# Patient Record
Sex: MALE | Race: WHITE | Employment: PART TIME | URBAN - METROPOLITAN AREA
[De-identification: names, ages, dates, MRNs, and addresses within clinical notes are randomized per-mention and may not be internally consistent; named-entity substitution may affect disease eponyms.]

---

## 2017-05-03 ENCOUNTER — ALLSCRIPTS OFFICE VISIT (OUTPATIENT)
Dept: OTHER | Facility: OTHER | Age: 67
End: 2017-05-03

## 2017-05-03 ENCOUNTER — GENERIC CONVERSION - ENCOUNTER (OUTPATIENT)
Dept: OTHER | Facility: OTHER | Age: 67
End: 2017-05-03

## 2017-05-03 DIAGNOSIS — Z12.11 ENCOUNTER FOR SCREENING FOR MALIGNANT NEOPLASM OF COLON: ICD-10-CM

## 2017-05-03 DIAGNOSIS — E78.5 HYPERLIPIDEMIA: ICD-10-CM

## 2017-05-03 DIAGNOSIS — Z13.1 ENCOUNTER FOR SCREENING FOR DIABETES MELLITUS: ICD-10-CM

## 2017-05-03 DIAGNOSIS — E55.9 VITAMIN D DEFICIENCY: ICD-10-CM

## 2017-10-24 ENCOUNTER — TRANSCRIBE ORDERS (OUTPATIENT)
Dept: LAB | Facility: OTHER | Age: 67
End: 2017-10-24

## 2017-10-24 ENCOUNTER — APPOINTMENT (OUTPATIENT)
Dept: LAB | Facility: OTHER | Age: 67
End: 2017-10-24
Payer: MEDICARE

## 2017-10-24 DIAGNOSIS — E78.5 HYPERLIPIDEMIA: ICD-10-CM

## 2017-10-24 DIAGNOSIS — E55.9 VITAMIN D DEFICIENCY: ICD-10-CM

## 2017-10-24 DIAGNOSIS — Z13.1 ENCOUNTER FOR SCREENING FOR DIABETES MELLITUS: ICD-10-CM

## 2017-10-24 DIAGNOSIS — R97.20 ELEVATED PROSTATE SPECIFIC ANTIGEN (PSA): Primary | ICD-10-CM

## 2017-10-24 LAB
25(OH)D3 SERPL-MCNC: 22.7 NG/ML (ref 30–100)
ALBUMIN SERPL BCP-MCNC: 3.7 G/DL (ref 3.5–5)
ALP SERPL-CCNC: 39 U/L (ref 46–116)
ALT SERPL W P-5'-P-CCNC: 38 U/L (ref 12–78)
ANION GAP SERPL CALCULATED.3IONS-SCNC: 5 MMOL/L (ref 4–13)
AST SERPL W P-5'-P-CCNC: 34 U/L (ref 5–45)
BILIRUB SERPL-MCNC: 0.82 MG/DL (ref 0.2–1)
BUN SERPL-MCNC: 19 MG/DL (ref 5–25)
CALCIUM SERPL-MCNC: 9.1 MG/DL (ref 8.3–10.1)
CHLORIDE SERPL-SCNC: 104 MMOL/L (ref 100–108)
CHOLEST SERPL-MCNC: 160 MG/DL (ref 50–200)
CO2 SERPL-SCNC: 26 MMOL/L (ref 21–32)
CREAT SERPL-MCNC: 1.39 MG/DL (ref 0.6–1.3)
GFR SERPL CREATININE-BSD FRML MDRD: 52 ML/MIN/1.73SQ M
GLUCOSE P FAST SERPL-MCNC: 93 MG/DL (ref 65–99)
HDLC SERPL-MCNC: 36 MG/DL (ref 40–60)
LDLC SERPL CALC-MCNC: 83 MG/DL (ref 0–100)
POTASSIUM SERPL-SCNC: 4.7 MMOL/L (ref 3.5–5.3)
PROT SERPL-MCNC: 7.4 G/DL (ref 6.4–8.2)
PSA SERPL-MCNC: 4.3 NG/ML (ref 0–4)
SODIUM SERPL-SCNC: 135 MMOL/L (ref 136–145)
TRIGL SERPL-MCNC: 203 MG/DL

## 2017-10-24 PROCEDURE — 82306 VITAMIN D 25 HYDROXY: CPT

## 2017-10-24 PROCEDURE — 80053 COMPREHEN METABOLIC PANEL: CPT

## 2017-10-24 PROCEDURE — 36415 COLL VENOUS BLD VENIPUNCTURE: CPT

## 2017-10-24 PROCEDURE — G0103 PSA SCREENING: HCPCS

## 2017-10-24 PROCEDURE — 80061 LIPID PANEL: CPT

## 2017-10-30 ENCOUNTER — ALLSCRIPTS OFFICE VISIT (OUTPATIENT)
Dept: OTHER | Facility: OTHER | Age: 67
End: 2017-10-30

## 2017-10-30 DIAGNOSIS — R79.89 OTHER SPECIFIED ABNORMAL FINDINGS OF BLOOD CHEMISTRY: ICD-10-CM

## 2017-10-31 NOTE — PROGRESS NOTES
Assessment  1  Elevated serum creatinine (790 99) (R79 89)   2  Vitamin D deficiency (268 9) (E55 9)   3  Enlarged prostate (600 00) (N40 0)   4  Hypertriglyceridemia (272 1) (E78 1)    Plan  Elevated serum creatinine    · (1) COMPREHENSIVE METABOLIC PANEL; Status:Active; Requested FMK:[id number]; Health Maintenance    · *VB - Fall Risk Assessment  (Dx Z13 89 Screen for Neurologic Disorder);  Status:Complete - Retrospective Authorization;   Done: 66YAE9627 09:05AM   · *VB - Urinary Incontinence Screen (Dx Z13 89 Screen for UI); Status:Complete -  Retrospective Authorization;   Done: 14YCJ9792 09:05AM    Discussion/Summary    Elevated creatinine- he was counseled in regards to avoid NSAIDs however he states that he needs it for his back advised to keep for a bare minimum and advised not to take and consider taking Tylenol instead  D deficiency- he states that he will take OTC Vit D supplementation  Prostate- he was advised to follow up with Urologist, Dr Matt Kam  he was advised to continue a low car diet  Also advised to take a daily fish oil  up in 3 months  Possible side effects of new medications were reviewed with the patient/guardian today  The treatment plan was reviewed with the patient/guardian  The patient/guardian understands and agrees with the treatment plan      Chief Complaint  Patient is here today for follow up on medical issues and review test results      History of Present Illness  Patient is here to follow up for elevated creatinine, 1 39, he does have a history of enlarged prostate and sees Dr Matt Kam  He denies any problems with hi kidneys in the past triglycerides at this time over 200 mg/dL he does take fenofibrate daily  He says that he does not follow a regular low Carb diet however  Vit D h used to be on supplements however he stopped taking them months ago  was found to have elevated PSA 4 3   He dies have a history of enlarged prostate and sees Dr Matt Kam for urologist       Review of Systems    Constitutional: No fever or chills, feels well, no tiredness, no recent weight gain or weight loss  Eyes: No complaints of eye pain, no red eyes, no discharge from eyes, no itchy eyes  Cardiovascular: No complaints of slow heart rate, no fast heart rate, no chest pain, no palpitations, no leg claudication, no lower extremity  Respiratory: No complaints of shortness of breath, no wheezing, no cough, no SOB on exertion, no orthopnea or PND  Gastrointestinal: No complaints of abdominal pain, no constipation, no nausea or vomiting, no diarrhea or bloody stools  Genitourinary: as noted in HPI  Musculoskeletal: No complaints of arthralgia, no myalgias, no joint swelling or stiffness, no limb pain or swelling  Integumentary: No complaints of skin rash or skin lesions, no itching, no skin wound, no dry skin  Neurological: No compliants of headache, no confusion, no convulsions, no numbness or tingling, no dizziness or fainting, no limb weakness, no difficulty walking  Endocrine: No complaints of proptosis, no hot flashes, no muscle weakness, no erectile dysfunction, no deepening of the voice, no feelings of weakness  Preventive Quality 65 and Older: Falls Risk: The patient fell 0 times in the past 12 months  The patient is currently experiencing urinary symptoms  Urinary Incontinence Symptoms includes: nocturia    sees urologist      ROS reviewed  Active Problems  1  Anxiety (300 00) (F41 9)   2  Cervical radiculopathy (723 4) (M54 12)   3  Colon cancer screening (V76 51) (Z12 11)   4  Encounter for screening for cardiovascular disorders (V81 2) (Z13 6)   5  Hyperglycemia (790 29) (R73 9)   6  Hyperlipidemia (272 4) (E78 5)   7  Hypertension (401 9) (I10)   8  Joint pain (719 40) (M25 50)   9  Medicare annual wellness visit, initial (V70 0) (Z00 00)   10  Peripheral neuropathy (356 9) (G62 9)   11  RLS (restless legs syndrome) (333 94) (G25 81)   12   Screening for colon cancer (V76 51) (Z12 11)   13  Screening for diabetes mellitus (V77 1) (Z13 1)   14  Screening for lipid disorders (V77 91) (Z13 220)   15  Testicle pain (608 9) (N50 819)   16  Vitamin D deficiency (268 9) (E55 9)    Past Medical History  1  History of Displacement of lumbar intervertebral disc without myelopathy (722 10)   (M51 26)   2  History of Screening for colon cancer (V76 51) (Z12 11)   3  History of Stenosis, spinal, lumbar (724 02) (M48 061)    The active problems and past medical history were reviewed and updated today  Surgical History  1  History of Cholecystectomy    Family History  Mother    1  Family history of    2  Family history of Dialysis patient   3  Family history of kidney disease (V18 69) (Z84 1)  Father    4  Family history of    5  Family history of cardiac disorder (V17 49) (Z82 49)    Social History   · Alcohol Use (History)   · Caffeine Use   · Former smoker (W25 73) (U14 504)   ·    · Retired    Current Meds   1  Aspirin Low Dose 81 MG Oral Tablet Chewable; CHEW 3 TABLET Daily PRN; Therapy: (Recorded:46Mvr9336) to Recorded   2  Fenofibrate 160 MG Oral Tablet; take 1 tablet every day; Therapy: 26GTC2964 to (Evaluate:2018)  Requested for: 73Frc2921; Last   Rx:94Ood8800 Ordered   3  Flomax 0 4 MG Oral Capsule; TAKE CAPSULE Daily; Therapy: (Recorded:2016) to Recorded   4  Gabapentin 600 MG Oral Tablet; take 1/2 tablet twice daily; Therapy: 29ONZ1006 to (Evaluate:2018)  Requested for: 18Fkt9837; Last   Rx:89Juj6479 Ordered   5  Glucosamine-Chondroitin Oral Tablet; take 2 tablet daily; Therapy: (Kristin Vela) to Recorded   6  Lisinopril-Hydrochlorothiazide 20-12 5 MG Oral Tablet; take 1 tablet every day; Therapy: 22BKJ1488 to (Evaluate:23Pib6198)  Requested for: 74Zbn9102; Last   Rx:88Nas4295 Ordered   7  LORazepam 1 MG Oral Tablet; take 1 tablet daily prn; Therapy: 51CKG2466 to (Evaluate:65Tqe5822); Last VF:22MSX3965 Ordered   8   Mobic 15 MG Oral Tablet; TAKE TABLET Daily PRN; Therapy: (Recorded:02Mar2016) to Recorded   9  Multi For Him 50+ TABS; TAKE 1 TABLET DAILY; Therapy: (Recorded:78Tgt5296) to Recorded   10  Saw [place] TABS Recorded   11  Tylenol 325 MG Oral Tablet; TAKE 2 TABLET Daily PRN; Therapy: (Recorded:50Vzn9147) to Recorded   12  Vitamin D3 1000 UNIT Oral Tablet; TAKE 1 TABLET DAILY; Therapy: (Recorded:02Mar2016) to Recorded    Allergies  1  Statins  2  No Known Environmental Allergies   3  No Known Food Allergies    Vitals  Vital Signs    Recorded: 69PQG8852 09:02AM   Temperature 97 6 F   Heart Rate 74   Systolic 647   Diastolic 72   Height 5 ft 10 in   Weight 237 lb 8 0 oz   BMI Calculated 34 08   BSA Calculated 2 25   O2 Saturation 98     Physical Exam    Constitutional   General appearance: No acute distress, well appearing and well nourished  Eyes   Conjunctiva and lids: No swelling, erythema, or discharge  Pulmonary   Respiratory effort: No increased work of breathing or signs of respiratory distress  Auscultation of lungs: Clear to auscultation, equal breath sounds bilaterally, no wheezes, no rales, no rhonci  Cardiovascular   Auscultation of heart: Normal rate and rhythm, normal S1 and S2, without murmurs  Musculoskeletal   Gait and station: Normal     Skin   Skin and subcutaneous tissue: Normal without rashes or lesions      Psychiatric   Orientation to person, place and time: Normal          Results/Data  *VB - Fall Risk Assessment  (Dx Z13 89 Screen for Neurologic Disorder) 03GBH7144 09:05AM [facility] Pharm[facility] Locker     Test Name Result Flag Reference   Falls Risk      No falls in the past year     *VB - Urinary Incontinence Screen (Dx Z13 89 Screen for UI) 34MTT6955 09:05AM Flonnie Locker   sees urology     Test Name Result Flag Reference   Urinary Incontinence Assessment 93ECW0042         Signatures   Electronically signed by : Zaina Arellano MD; Oct 30 2017  9:35AM EST                       (Author)

## 2017-11-22 ENCOUNTER — ALLSCRIPTS OFFICE VISIT (OUTPATIENT)
Dept: OTHER | Facility: OTHER | Age: 67
End: 2017-11-22

## 2017-11-23 NOTE — PROGRESS NOTES
Assessment  1  Peripheral neuropathy (356 9) (G62 9)   2  Strain of thoracic spine (847 1) (S29 019A)    Plan  Peripheral neuropathy    · Gabapentin 100 MG Oral Capsule; take 1 capsule 3 times a day   Rx By: Malcolm Newby; Dispense: 30 Days ; #:90 Capsule; Refill: 2;For: Peripheral neuropathy; BALWINDER = N; Verified Transmission to [facility]; Last Updated By: [employer]; 11/22/2017 1:06:06 PM  Peripheral neuropathy, RLS (restless legs syndrome)    · Gabapentin 600 MG Oral Tablet   Rx By: Malcolm Newby; Dispense: 90 Days ; #:90 TAB; Refill: 2;Peripheral neuropathy, RLS (restless legs syndrome); BALWINDER = N; Sent To: [facility]  Strain of thoracic spine    · Follow-up visit in 2 months Evaluation and Treatment  Follow-up  Status: Hold For -Scheduling  Requested for: 22Nov2017   Ordered;Strain of thoracic spine; Ordered By: Malcolm Newby Performed:  Due: 28ULD8264    Discussion/Summary  Discussion Summary:   Patient with a history of recent mid back pain, history polyneuropathy, restless leg syndrome is now advised to lower the dose of gabapentin 200 mg 3 times a day due to his renal function  Patient is advised to repeat BUN creatinine in 2-3 weeks and if he continues to have pain in the thoracic region may benefit from a CT scan of the abdomen to rule out the possibility of a renal calculus  Patient will also discuss the same with his urologist  Patient will return back to see me in 2-3 months  Chief Complaint  Chief Complaint Free Text Note Form: Patient present for follow up appointment for his peripheral neuropathy and RLS  History of Present Illness  HPI: Patient is here for a follow-up visit with a history of polyneuropathy, and has been maintained on gabapentin 300 mg twice a day  He also had a history of low back pain for which he used to use meloxicam on a p r n  basis   In the recent past he describes pain in the thoracic region ever since he bent down to perform and task at home and since then has been experiencing intermittent left flank pain for the last 1 month which generally relieved with the help of meloxicam  In the meanwhile he went to his primary physician and also had blood work done and his creatinine level was noted to be elevated  Since then the patient has not been using meloxicam  Patient also is followed up with Urology for prostatic enlargement and denies any new neurological symptoms  Review of Systems  Neurological ROS:  Constitutional: no fever, no chills, no recent weight gain, no recent weight loss, no complaints of feeling tired, no changes in appetite  HEENT:  no sinus problems, not feeling congested, no blurred vision, no dryness of the eyes, no eye pain, no hearing loss, no tinnitus, no mouth sores, no sore throat, no hoarseness, no dysphagia, no masses, no bleeding  Cardiovascular:  no chest pain or pressure, no palpitations present, the heart rate was not rapid or irregular, no swelling in the arms or legs, no poor circulation  Respiratory:  no unusual or persistant cough, no shortness of breath with or without exertion  Gastrointestinal:  no nausea, no vomiting, no diarrhea, no abdominal pain, no changes in bowel habits, no melena, no loss of bowel control  Genitourinary:  no incontinence, no feelings of urinary urgency, no increase in frequency, no urinary hesitancy, no dysuria, no hematuria  Musculoskeletal: immobility or loss of function,-- head/neck/back pain-- and-- pain while walking  Integumentary  no masses, no rash, no skin lesions, no livedo reticularis  Psychiatric:  no anxiety, no depression, no mood swings, no psychiatric hospitalizations, no sleep problems  Endocrine hair loss or gain-- and-- loss of sexual ability or drive   Hematologic/Lymphatic:  no unusual bleeding, no tendency for easy bruising, no clotting skin or lumps  Neurological General: night thrashing    Neurological Mental Status:  no confusion, no mood swings, no alteration or loss of consciousness, no difficulty expressing/understanding speech, no memory problems  Neurological Cranial Nerves: taste or smell loss/changes-- and-- slurred speech  Neurological Motor findings include:  no tremor, no twitching, no cramping(pre/post exercise), no atrophy  Neurological Coordination: balance difficulties  Neurological Sensory:  no numbness, no pain, no tingling, does not fall when eyes closed or taking a shower  Neurological Gait: difficulty walking  ROS Reviewed:   ROS reviewed  Active Problems  1  Anxiety (300 00) (F41 9)   2  Cervical radiculopathy (723 4) (M54 12)   3  Colon cancer screening (V76 51) (Z12 11)   4  Elevated serum creatinine (790 99) (R79 89)   5  Encounter for screening for cardiovascular disorders (V81 2) (Z13 6)   6  Enlarged prostate (600 00) (N40 0)   7  Hyperglycemia (790 29) (R73 9)   8  Hyperlipidemia (272 4) (E78 5)   9  Hypertension (401 9) (I10)   10  Hypertriglyceridemia (272 1) (E78 1)   11  Joint pain (719 40) (M25 50)   12  Medicare annual wellness visit, initial (V70 0) (Z00 00)   13  Peripheral neuropathy (356 9) (G62 9)   14  RLS (restless legs syndrome) (333 94) (G25 81)   15  Screening for colon cancer (V76 51) (Z12 11)   16  Screening for diabetes mellitus (V77 1) (Z13 1)   17  Screening for lipid disorders (V77 91) (Z13 220)   18  Testicle pain (608 9) (N50 819)   19  Vitamin D deficiency (268 9) (E55 9)    Past Medical History    1  History of Displacement of lumbar intervertebral disc without myelopathy (722 10) (M51 26)   2  History of Screening for colon cancer (V76 51) (Z12 11)   3  History of Stenosis, spinal, lumbar (724 02) (M48 061)    Surgical History  1  History of Cholecystectomy    Family History  Mother    1  Family history of    2  Family history of Dialysis patient   3  Family history of kidney disease (V18 69) (Z84 1)  Father    4  Family history of    5   Family history of cardiac disorder (V17 49) (Z82 49)    Social History     · Alcohol Use (History)   · Caffeine Use   · Former smoker (E37 83) (O34 774)   ·    · Retired  Social History Reviewed: The social history was reviewed and updated today  Current Meds   1  Aspirin Low Dose 81 MG Oral Tablet Chewable; CHEW 3 TABLET Daily PRN; Therapy: (Recorded:85Jjt4492) to Recorded   2  Fenofibrate 160 MG Oral Tablet; take 1 tablet every day; Therapy: 72LUV7037 to (Evaluate:12Mar2018)  Requested for: 26Cpc9961; Last Rx:23Txb5632 Ordered   3  Flomax 0 4 MG Oral Capsule; TAKE CAPSULE Daily; Therapy: (Recorded:02Mar2016) to Recorded   4  Gabapentin 600 MG Oral Tablet; take 1/2 tablet twice daily; Therapy: 19PPW4476 to (Evaluate:09Jun2018)  Requested for: 18Zle3791; Last Rx:96Fem5838 Ordered   5  Glucosamine-Chondroitin Oral Tablet; take 2 tablet daily; Therapy: (Agus Vasquez) to Recorded   6  Lisinopril-Hydrochlorothiazide 20-12 5 MG Oral Tablet; take 1 tablet every day; Therapy: 97BYK1820 to (Evaluate:34Vft6529)  Requested for: 42DFR9281; Last Rx:17Cff3629 Ordered   7  Mobic 15 MG Oral Tablet; TAKE TABLET Daily PRN; Therapy: (Recorded:02Mar2016) to Recorded   8  Multi For Him 50+ TABS; TAKE 1 TABLET DAILY; Therapy: (Agus Vasquez) to Recorded   9  Saw [place] TABS Recorded   10  Tylenol 325 MG Oral Tablet; TAKE 2 TABLET Daily PRN; Therapy: (Recorded:49Lxv0336) to Recorded   11  Vitamin D3 1000 UNIT Oral Tablet; TAKE 1 TABLET DAILY; Therapy: (Recorded:02Mar2016) to Recorded  Medication List Reviewed: The medication list was reviewed and updated today  Allergies  1  Statins    2  No Known Environmental Allergies   3   No Known Food Allergies    Vitals  Signs   Recorded: 22Nov2017 12:37PM   Heart Rate: 60  Systolic: 765  Diastolic: 66  Height: 5 ft 9 5 in  Weight: 230 lb   BMI Calculated: 33 48  BSA Calculated: 2 2    Physical Exam   Constitutional  General appearance: No acute distress, well appearing and well nourished  Musculoskeletal  Gait and station: Abnormal  -- Patient has a mild sway on Romberg testing  Muscle strength: Normal strength throughout  Muscle tone: No atrophy, abnormal movements, flaccidity, cogwheeling or spasticity  Neurologic  Orientation to person, place, and time: Normal    Language: Names objects, able to repeat phrases and speaks spontaneously  3rd, 4th, and 6th cranial nerves: Normal    7th cranial nerve: Normal    Sensation: Normal    Reflexes: Normal    Coordination: Normal   Patient has evidence of left flank tenderness  Future Appointments    Date/Time Provider Specialty Site   01/22/2018 02:20 PM Sumanth Champion MD Neurology [facility] [address] [place] [address]   Electronically signed by :  Belinda Montiel MD; Nov 22 2017  1:18PM EST                       (Author)

## 2018-01-14 VITALS
OXYGEN SATURATION: 98 % | SYSTOLIC BLOOD PRESSURE: 122 MMHG | DIASTOLIC BLOOD PRESSURE: 66 MMHG | TEMPERATURE: 97.9 F | HEART RATE: 76 BPM | BODY MASS INDEX: 34.23 KG/M2 | WEIGHT: 239.13 LBS | HEIGHT: 70 IN

## 2018-01-14 VITALS
DIASTOLIC BLOOD PRESSURE: 66 MMHG | WEIGHT: 230 LBS | BODY MASS INDEX: 32.93 KG/M2 | HEIGHT: 70 IN | SYSTOLIC BLOOD PRESSURE: 108 MMHG | HEART RATE: 60 BPM

## 2018-01-14 VITALS
TEMPERATURE: 97.6 F | SYSTOLIC BLOOD PRESSURE: 118 MMHG | OXYGEN SATURATION: 98 % | HEART RATE: 74 BPM | BODY MASS INDEX: 34 KG/M2 | WEIGHT: 237.5 LBS | DIASTOLIC BLOOD PRESSURE: 72 MMHG | HEIGHT: 70 IN

## 2018-01-16 NOTE — PROGRESS NOTES
Assessment    1  Medicare annual wellness visit, initial (V70 0) (Z00 00)   2  Encounter for screening for cardiovascular disorders (V81 2) (Z13 6)   3  History of Screening for colon cancer (V76 51) (Z12 11)   4  Screening for colon cancer (V76 51) (Z12 11)    Plan  Encounter for screening for cardiovascular disorders    · COLONOSCOPY; Status:Active; Requested BECKY:[id number]; Health Maintenance    · *VB - Fall Risk Assessment  (Dx V80 09 Screen for Neurologic Disorder);  Status:Complete;   Done: 06VQO8137 08:26AM   · *VB-Depression Screening; Status:Complete;   Done: 94IGL0337 08:27AM  Medicare annual wellness visit, initial    · [address]; Status:Hold For - Scheduling; Requested MYX:01JLE5139;     Discussion/Summary    Medicare wellness initial- advance directives paperwork given, U/S for AAA given  Patient is not interested on Immunizations at this time  Colonoscopy order given today  Impression: Initial Annual Wellness Visit  Cardiovascular screening and counseling: due for a lipid panel and Dx - V81 2 Screen for CV Disorder  Diabetes screening and counseling: due for blood glucose and Dx - V77 1 Screen for DM  Colorectal cancer screening and counseling: the risks and benefits of screening were discussed and Dx - V76 51 Screen for CRC  Prostate cancer screening and counseling: screening is current  Osteoporosis screening and counseling: screening not indicated  Abdominal aortic aneurysm screening and counseling: screening US recommended and Dx - V81 2 Screen for CV Disorder  Glaucoma screening and counseling: the risks and benefits of screening were discussed  Immunizations: the risks and benefits of influenza vaccination were discussed with the patient, the risks and benefits of pneumococcal vaccination were discussed with the patient, the risks and benefits of the Zostavax vaccine were discussed with the patient and Tdap vaccination up to date     Advance Directive Planning: complete and up to date  Chief Complaint  Patient seen in office today for a Medicare wellness exam       History of Present Illness  Medicare initial wellness visit   The patient is being seen for the initial annual wellness visit  Medicare Screening and Risk Factors   Hospitalizations: no previous hospitalizations  Medicare Screening Tests Risk Questions   Drug and Alcohol Use: The patient is a former cigarette smoker and quit smoking >25 yrs  The patient reports occasional alcohol use  He has never used illicit drugs  Diet and Physical Activity: Current diet includes well balanced meals, frequent junk food, 2 servings of fruit per day, 1 servings of meat per day, 2 servings of whole grains per day, 2 servings of dairy products per day and 3 cups of coffee per day  He is sedentary  Exercise: stretching  Functional Ability/Level of Safety: The patient is currently able to do activities of daily living without limitations, able to participate in social activities without limitations and able to drive without limitations  Advance Directives: Advance directives: no living will  Co-Managers and Medical Equipment/Suppliers: See Patient Care Team      Patient Care Team    Care Team Member Role Specialty Office Number   Tere Huerta MD Specialist Neurology [phone]   Justa Robledo MD Specialist Urology [phone]     Review of Systems    Constitutional: negative  Cardiovascular: negative  Respiratory: negative  Gastrointestinal: negative  Integumentary and Breasts: negative  Over the past 2 weeks, how often have you been bothered by the following problems? 1 ) Little interest or pleasure in doing things? Not at all    2 ) Feeling down, depressed or hopeless? Not at all    3 ) Trouble falling asleep or sleeping too much? Not at all    4 ) Feeling tired or having little energy? Not at all    5 ) Poor appetite or overeating?  Not at all    6 ) Feeling bad about yourself, or that you are a failure, or have let yourself or your family down? Not at all    7 ) Trouble concentrating on things, such as reading a newspaper or watching television? Not at all    8 ) Moving or speaking so slowly that other people could have noticed, or the opposite, moving or speaking faster than usual? Not at all  How difficult have these problems made it for you to do your work, take care of things at home, or get along with people? Not at all  Score 0      Active Problems    1  Cervical radiculopathy (723 4) (M54 12)   2  Hyperglycemia (790 29) (R73 9)   3  Hyperlipidemia (272 4) (E78 5)   4  Hypertension (401 9) (I10)   5  Joint pain (719 40) (M25 50)   6  Peripheral neuropathy (356 9) (G62 9)   7  Testicle pain (608 9) (N50 8)   8  Vitamin D deficiency (268 9) (E55 9)    Past Medical History    1  History of Displacement of lumbar intervertebral disc without myelopathy (722 10)   (M51 26)   2  History of Screening for colon cancer (V76 51) (Z12 11)   3  History of Stenosis, spinal, lumbar (724 02) (M48 06)    The active problems and past medical history were reviewed and updated today  Surgical History    1  History of Cholecystectomy    Family History  Father    1  No pertinent family history    Social History    · Denied: Alcohol Use (History)   · Caffeine Use    Current Meds   1  Fenofibrate 160 MG Oral Tablet; Take 1 tablet daily; Therapy: 93CQR4092 to (Evaluate:38Eps4626)  Requested for: 26XVU1794; Last   Rx:02Hxw5996 Ordered   2  Flomax 0 4 MG Oral Capsule; TAKE CAPSULE Daily; Therapy: (Recorded:02Mar2016) to Recorded   3  Gabapentin 300 MG TABS; Take 1 tablet twice daily; Therapy: (Recorded:02Mar2016) to Recorded   4  Glucosamine-Chondroitin Oral Tablet; TAKE TABLET Daily; Therapy: (Recorded:02Mar2016) to Recorded   5  Lisinopril-Hydrochlorothiazide 20-12 5 MG Oral Tablet; TAKE 1 TABLET DAILY;    Therapy: 89WJK3783 to (Evaluate:62Gmk4500)  Requested for: 24QJK4301; Last   EK:88AFX0062 Ordered   6  Mobic 15 MG Oral Tablet; TAKE TABLET Daily PRN; Therapy: (Recorded:02Mar2016) to Recorded   7  Saw [place] TABS Recorded   8  Vitamin D3 1000 UNIT Oral Tablet; TAKE 1 TABLET DAILY; Therapy: (Recorded:02Mar2016) to Recorded    Allergies    1   Statins    Immunizations  Influenza --- [facility]: Temporarily Deferred: Pt requests deferral   Pneumococcal --- [facility]: Temporarily Deferred: Pt requests deferral   Tdap --- [facility]: [id number]   Zoster --- [facility]: Temporarily Deferred: Pt requests deferral     Vitals  Signs [Data Includes: Current Encounter]   Recorded: 04ANQ6622 08:03AM   Temperature: 96 1 F  Heart Rate: 59  Systolic: 188  Diastolic: 66  Height: 5 ft 11 in  Weight: 237 lb 6 08 oz  BMI Calculated: 33 11  BSA Calculated: 2 26  O2 Saturation: 98    Results/Data  Encounter Results   *VB-Depression Screening 08LDQ2672 08:27AM Nena Nageotte     Test Name Result Flag Reference   Depression Scale Result      Depression Screen - Negative For Symptoms     *VB - Fall Risk Assessment  (Dx V80 09 Screen for Neurologic Disorder) 11HPC5019 08:26AM Nena Nageotte     Test Name Result Flag Reference   Fall Risk Assessment 12FGY6479       *VB-Urinary Incontinence Screen (Dx V81 6 Screen for UI) 24EWI0363 08:25AM Nena Nageotte     Test Name Result Flag Reference   Urinary Incontinence Assessment 06ZNZ7789         Future Appointments    Date/Time Provider Specialty Site   08/26/2016 11:00 AM Whitney West MD Neurology [facility] [address]   Electronically signed by : Svitlana Allen MD; Jun 7 2016  8:51AM EST                       (Author)

## 2018-01-17 NOTE — PROGRESS NOTES
Dear Tawanna Angel : We missed you for your originally scheduled neurological followup appointment with Dr Campbell Player  Please call at your earliest convenience to reschedule this appointment  Sincerely,     Delfina Tobias 89           Electronically signed by: Geovanny Mcguire   May  3 2017  9:36AM EST Co-author

## 2018-01-19 ENCOUNTER — APPOINTMENT (OUTPATIENT)
Dept: LAB | Facility: OTHER | Age: 68
End: 2018-01-19
Payer: MEDICARE

## 2018-01-19 ENCOUNTER — TRANSCRIBE ORDERS (OUTPATIENT)
Dept: LAB | Facility: OTHER | Age: 68
End: 2018-01-19

## 2018-01-19 DIAGNOSIS — R79.89 OTHER SPECIFIED ABNORMAL FINDINGS OF BLOOD CHEMISTRY: ICD-10-CM

## 2018-01-19 LAB
ALBUMIN SERPL BCP-MCNC: 4.1 G/DL (ref 3.5–5)
ALP SERPL-CCNC: 41 U/L (ref 46–116)
ALT SERPL W P-5'-P-CCNC: 35 U/L (ref 12–78)
ANION GAP SERPL CALCULATED.3IONS-SCNC: 6 MMOL/L (ref 4–13)
AST SERPL W P-5'-P-CCNC: 23 U/L (ref 5–45)
BILIRUB SERPL-MCNC: 0.63 MG/DL (ref 0.2–1)
BUN SERPL-MCNC: 21 MG/DL (ref 5–25)
CALCIUM SERPL-MCNC: 9.3 MG/DL (ref 8.3–10.1)
CHLORIDE SERPL-SCNC: 105 MMOL/L (ref 100–108)
CO2 SERPL-SCNC: 28 MMOL/L (ref 21–32)
CREAT SERPL-MCNC: 1.44 MG/DL (ref 0.6–1.3)
GFR SERPL CREATININE-BSD FRML MDRD: 49 ML/MIN/1.73SQ M
GLUCOSE P FAST SERPL-MCNC: 108 MG/DL (ref 65–99)
POTASSIUM SERPL-SCNC: 4.5 MMOL/L (ref 3.5–5.3)
PROT SERPL-MCNC: 7.9 G/DL (ref 6.4–8.2)
SODIUM SERPL-SCNC: 139 MMOL/L (ref 136–145)

## 2018-01-19 PROCEDURE — 36415 COLL VENOUS BLD VENIPUNCTURE: CPT

## 2018-01-19 PROCEDURE — 80053 COMPREHEN METABOLIC PANEL: CPT

## 2018-01-22 ENCOUNTER — ALLSCRIPTS OFFICE VISIT (OUTPATIENT)
Dept: OTHER | Facility: OTHER | Age: 68
End: 2018-01-22

## 2018-01-22 DIAGNOSIS — S39.012A STRAIN OF MUSCLE, FASCIA AND TENDON OF LOWER BACK, INITIAL ENCOUNTER: ICD-10-CM

## 2018-01-23 NOTE — PROGRESS NOTES
Assessment   1  Peripheral neuropathy (356 9) (G62 9)   2  RLS (restless legs syndrome) (333 94) (G25 81)   3  Strain, lumbosacral (846 0) (S39 012A)    Plan   Cervical radiculopathy    · Mobic 15 MG Oral Tablet (Meloxicam)   Rx By: Claribel Ernandez; Dispense: 0 Days ; #: Sufficient Tablet; Refill: 0;For: Cervical radiculopathy; BALWINDER = N; Record; Last Updated By: Kourtney Weems; 1/22/2018 2:59:06 PM  Peripheral neuropathy    · Gabapentin 100 MG Oral Capsule; TAKE 1 CAPSULE TWICE DAILY   Rx By: Kourtney Weems; Dispense: 90 Days ; #:180 Capsule; Refill: 3;For: Peripheral neuropathy; BALWINDER = N; Verified Transmission to [facility]; Last Updated By: System, SureScripts; 1/22/2018 3:03:20 PM  Strain, lumbosacral    · Cyclobenzaprine HCl - 10 MG Oral Tablet; TAKE 1 TABLET EVERY DAY AT BEDTIME   Rx By: Kourtney Weems; Dispense: 30 Days ; #:30 Tablet; Refill: 1;For: Strain, lumbosacral; BALWINDER = N; Verified Transmission to [facility]; Last Updated By: System, SureScripts; 1/22/2018 3:03:09 PM   · Lidocaine 5 % External Patch; APPLY 1 PATCH TO THE AFFECTED AREA AND    LEAVE IN PLACE FOR 12 HOURS, THEN REMOVE AND LEAVE OFF FOR 12 HOURS   Rx By: Kourtney Weems; Dispense: 30 Days ; #:30 Patch; Refill: 6;For: Strain, lumbosacral; BALWINDER = N; Verified Transmission to [facility]; Last Updated By: System, SureScripts; 1/22/2018 3:03:08 PM   · Physical Therapy Referral Other Co-Management  *  Status: Active  Requested for:    [id number]   Ordered; For: Strain, lumbosacral; Ordered By: Kourtney Weems Performed:  Due: 14NMF5060  are Referring to a non- Preferred Provider : Established Patient  Care Summary provided  : Yes   · Follow-up visit in 2 months Evaluation and Treatment  Follow-up  Status: Complete     Done: [id number]   Ordered; For: Strain, lumbosacral; Ordered By: Kourtney Weems Performed:  Due: 87YXJ8493;  Last Updated By: Sergei Tenorio; 1/22/2018 3:06:01 PM    Discussion/Summary   Discussion Summary:    Patient with a history of low back pain, restless leg syndrome, polyneuropathy, is advised to lower the dose of gabapentin 200 mg twice a day, physical therapy to the lumbar region will be helpful, he is advised to try lidocaine patches to the lumbar region, Flexeril 10 mg at bedtime and will return back to see me in 2 months during which time there is no improvement will get a repeat MRI of the lumbar spine done  Chief Complaint   Chief Complaint Free Text Note Form: Patient is here for follow up visit for his history of peripheral neuropathy and pain across the lower back  History of Present Illness   HPI: Patient is here for a follow-up visit and since his last visit was advised to lower the dose of gabapentin and currently using 100 mg 3 times a day with no worsening symptomatology of his lower extremities  Occasionally has noted worsening of his restless leg symptoms but otherwise claims a tingling numbness in the paresthesias have resolved  Patient continues to experience pain across the lower back and the waist region not localized to the flank anymore but denies any pain radiating down the lower extremities  He has discontinued Mobic for a while due to persistently elevated creatinine level and does experience pain in the small joints of his hands occurring intermittently  Patient is also followed up with Urology for prostatic enlargement  Review of Systems   Neurological ROS:      Constitutional: no fever, no chills, no recent weight gain, no recent weight loss, no complaints of feeling tired, no changes in appetite  HEENT:  no sinus problems, not feeling congested, no blurred vision, no dryness of the eyes, no eye pain, no hearing loss, no tinnitus, no mouth sores, no sore throat, no hoarseness, no dysphagia, no masses, no bleeding        Cardiovascular:  no chest pain or pressure, no palpitations present, the heart rate was not rapid or irregular, no swelling in the arms or legs, no poor circulation  Respiratory:  no unusual or persistant cough, no shortness of breath with or without exertion  Gastrointestinal:  no nausea, no vomiting, no diarrhea, no abdominal pain, no changes in bowel habits, no melena, no loss of bowel control  Genitourinary: feelings of urinary urgency  Musculoskeletal: head/neck/back pain  Integumentary  no masses, no rash, no skin lesions, no livedo reticularis  Psychiatric:  no anxiety, no depression, no mood swings, no psychiatric hospitalizations, no sleep problems  Endocrine hair loss or gain  Hematologic/Lymphatic:  no unusual bleeding, no tendency for easy bruising, no clotting skin or lumps  Neurological General: headache  Neurological Mental Status:  no confusion, no mood swings, no alteration or loss of consciousness, no difficulty expressing/understanding speech, no memory problems  Neurological Cranial Nerves: taste or smell loss/changes  Neurological Motor findings include:  no tremor, no twitching, no cramping(pre/post exercise), no atrophy  Neurological Coordination:  no unsteadiness, no vertigo or dizziness, no clumsiness, no problems reaching for objects  Neurological Sensory: numbness-- and-- tingling  Neurological Gait:  no difficulty walking, not falling to one side, no sensation of being pushed, has not had falls  ROS Reviewed:    ROS reviewed  Active Problems   1  Anxiety (300 00) (F41 9)   2  Cervical radiculopathy (723 4) (M54 12)   3  Colon cancer screening (V76 51) (Z12 11)   4  Elevated serum creatinine (790 99) (R79 89)   5  Encounter for screening for cardiovascular disorders (V81 2) (Z13 6)   6  Enlarged prostate (600 00) (N40 0)   7  Hyperglycemia (790 29) (R73 9)   8  Hyperlipidemia (272 4) (E78 5)   9  Hypertension (401 9) (I10)   10  Hypertriglyceridemia (272 1) (E78 1)   11  Joint pain (719 40) (M25 50)   12  Medicare annual wellness visit, initial (V70 0) (Z00 00)   13  Peripheral neuropathy (356 9) (G62 9)   14  RLS (restless legs syndrome) (333 94) (G25 81)   15  Screening for colon cancer (V76 51) (Z12 11)   16  Screening for diabetes mellitus (V77 1) (Z13 1)   17  Screening for lipid disorders (V77 91) (Z13 220)   18  Strain of thoracic spine (847 1) (S29 019A)   19  Testicle pain (608 9) (N50 819)   20  Vitamin D deficiency (268 9) (E55 9)    Past Medical History   1  History of Displacement of lumbar intervertebral disc without myelopathy (722 10)     (M51 26)   2  History of Screening for colon cancer (V76 51) (Z12 11)   3  History of Stenosis, spinal, lumbar (724 02) (M48 061)    Surgical History   1  History of Cholecystectomy    Family History   Mother    1  Family history of    2  Family history of Dialysis patient   3  Family history of kidney disease (V18 69) (Z84 1)  Father    4  Family history of    5  Family history of cardiac disorder (V17 49) (Z82 49)    Social History    · Alcohol Use (History)   · Caffeine Use   · Former smoker (T60 02) (L40 935)   ·    · Retired  Social History Reviewed: The social history was reviewed and updated today  Current Meds    1  Aspirin Low Dose 81 MG Oral Tablet Chewable; CHEW 3 TABLET Daily PRN; Therapy: (Recorded:84Ycj6193) to Recorded   2  Fenofibrate 160 MG Oral Tablet; take 1 tablet every day; Therapy: 93FIK4389 to (Evaluate:2018)  Requested for: 86Iud6403; Last     Rx:54Sem4406 Ordered   3  Flomax 0 4 MG Oral Capsule; TAKE CAPSULE Daily; Therapy: (Recorded:2016) to Recorded   4  Gabapentin 100 MG Oral Capsule; take 1 capsule 3 times a day; Therapy: 25VNW5341 to (Evaluate:10Oam3310)  Requested for: 31DKU9393; Last     Rx:2017 Ordered   5  Glucosamine-Chondroitin Oral Tablet; take 2 tablet daily; Therapy: (Onesimo Alonso) to Recorded   6   Lisinopril-Hydrochlorothiazide 20-12 5 MG Oral Tablet; take 1 tablet every day; Therapy: 65CRD4978 to (Evaluate:07Zll9880)  Requested for: 44CUW5824; Last     Rx:15Nov2017 Ordered   7  Mobic 15 MG Oral Tablet; TAKE TABLET Daily PRN; Therapy: (Recorded:02Mar2016) to Recorded   8  Multi For Him 50+ TABS; TAKE 1 TABLET DAILY; Therapy: (Ermalinda Captain) to Recorded   9  Saw [place] TABS Recorded   10  Tylenol 325 MG Oral Tablet; TAKE 2 TABLET Daily PRN; Therapy: (Recorded:95Cip4785) to Recorded   11  Vitamin D3 5000 UNIT Oral Tablet; Take 1 tablet daily; Therapy: (Recorded:22Jan2018) to Recorded  Medication List Reviewed: The medication list was reviewed and updated today  Allergies   1  Statins  2  No Known Environmental Allergies   3  No Known Food Allergies    Vitals   Signs   Recorded: 31RML6787 02:19PM   Heart Rate: 63  Systolic: 575  Diastolic: 80  Weight: 940 lb   BMI Calculated: 34 35  BSA Calculated: 2 23    Physical Exam        Constitutional      General appearance: No acute distress, well appearing and well nourished  Musculoskeletal      Gait and station: Normal gait, stance and balance  Muscle strength: Normal strength throughout  Muscle tone: No atrophy, abnormal movements, flaccidity, cogwheeling or spasticity  Neurologic      Orientation to person, place, and time: Normal        Language: Names objects, able to repeat phrases and speaks spontaneously  3rd, 4th, and 6th cranial nerves: Normal        7th cranial nerve: Normal        Sensation: Abnormal        Reflexes: Normal        Coordination: Normal   Patient has evidence of lumbosacral tenderness in the lower lumbar region      Signatures    Electronically signed by :  Sue Quintaan MD; Jan 22 2018  3:40PM EST                       (Author)

## 2018-02-16 ENCOUNTER — OFFICE VISIT (OUTPATIENT)
Dept: FAMILY MEDICINE CLINIC | Facility: CLINIC | Age: 68
End: 2018-02-16
Payer: MEDICARE

## 2018-02-16 VITALS
TEMPERATURE: 97.8 F | HEART RATE: 78 BPM | DIASTOLIC BLOOD PRESSURE: 78 MMHG | SYSTOLIC BLOOD PRESSURE: 122 MMHG | BODY MASS INDEX: 32.76 KG/M2 | HEIGHT: 71 IN | OXYGEN SATURATION: 98 % | WEIGHT: 234 LBS

## 2018-02-16 DIAGNOSIS — E78.1 HIGH BLOOD TRIGLYCERIDES: ICD-10-CM

## 2018-02-16 DIAGNOSIS — Z12.11 SCREENING FOR COLON CANCER: ICD-10-CM

## 2018-02-16 DIAGNOSIS — G62.9 NEUROPATHY: ICD-10-CM

## 2018-02-16 DIAGNOSIS — N28.9 KIDNEY DISEASE: ICD-10-CM

## 2018-02-16 DIAGNOSIS — R19.05 PERIUMBILICAL MASS: ICD-10-CM

## 2018-02-16 DIAGNOSIS — I10 HYPERTENSION, UNSPECIFIED TYPE: ICD-10-CM

## 2018-02-16 DIAGNOSIS — R73.01 ELEVATED FASTING BLOOD SUGAR: Primary | ICD-10-CM

## 2018-02-16 LAB — SL AMB POCT HEMOGLOBIN AIC: 5.8

## 2018-02-16 PROCEDURE — 83036 HEMOGLOBIN GLYCOSYLATED A1C: CPT | Performed by: FAMILY MEDICINE

## 2018-02-16 PROCEDURE — 99214 OFFICE O/P EST MOD 30 MIN: CPT | Performed by: FAMILY MEDICINE

## 2018-02-16 RX ORDER — TAMSULOSIN HYDROCHLORIDE 0.4 MG/1
CAPSULE ORAL DAILY
COMMUNITY

## 2018-02-16 RX ORDER — UBIDECARENONE 30 MG
1 CAPSULE ORAL DAILY
COMMUNITY

## 2018-02-16 RX ORDER — FENOFIBRATE 160 MG/1
1 TABLET ORAL DAILY
COMMUNITY
Start: 2014-07-15 | End: 2018-03-02 | Stop reason: SDUPTHER

## 2018-02-16 RX ORDER — MAG HYDROX/ALUMINUM HYD/SIMETH 400-400-40
1 SUSPENSION, ORAL (FINAL DOSE FORM) ORAL DAILY
COMMUNITY

## 2018-02-16 RX ORDER — CYCLOBENZAPRINE HCL 10 MG
1 TABLET ORAL
COMMUNITY
Start: 2018-01-22 | End: 2018-07-18

## 2018-02-16 RX ORDER — GABAPENTIN 100 MG/1
1 CAPSULE ORAL 2 TIMES DAILY
COMMUNITY
Start: 2017-11-22 | End: 2018-07-18 | Stop reason: SDUPTHER

## 2018-02-16 RX ORDER — LISINOPRIL AND HYDROCHLOROTHIAZIDE 20; 12.5 MG/1; MG/1
1 TABLET ORAL DAILY
COMMUNITY
Start: 2013-07-15 | End: 2018-03-02 | Stop reason: SDUPTHER

## 2018-02-16 RX ORDER — ASPIRIN 81 MG/1
243 TABLET, CHEWABLE ORAL DAILY PRN
COMMUNITY

## 2018-02-16 NOTE — PROGRESS NOTES
Assessment/Plan:   Diagnoses and all orders for this visit:    Elevated fasting blood sugar  -     POCT hemoglobin A1c 5 8%  Manage through diet and exercise  Reduce cake and sugar consumption to maximum once per week  Begin exerise in the form of daily walking  30 minutes per day after breakfast, lunch, and dinner    High blood triglycerides  Continue fenofibrate 160mg po daily  Eat a moderate fat low sugar diet  Begin exerise in the form of daily walking  30 minutes per day after breakfast, lunch, and dinner    Hypertension, unspecified type  Continue taking lisinopril-hctz 20-12 5mg po daily  Continue attempts to lose weight through diet and exercise  Keep salt intake moderate  Kidney disease  Continue taking lisinopril-hctz 20-12 5mg po daily  Continue attempts to lose weight through diet and exercise  Keep salt intake moderate  Manage fasting blood sugars- reduce sugar intake and begin 30 minutes of exercise daily  Stay hydrated and drink fluids with each meal and between meals  Keep caffeine intake to a minumum  Avoid use of NSAIDs  Consider consult with nephrology if condition worsens or does not improve  Neuropathy  Continue gabapentin  Continue to follow neurology  Screening for colon cancer  FIT test  Periumbilical mass  Patient does not wish to address at this time, causes him no pain or discomfort  Other orders  -     aspirin 81 mg chewable tablet; Chew Daily  -     cyclobenzaprine (FLEXERIL) 10 mg tablet; Take 1 tablet by mouth  -     fenofibrate (TRIGLIDE) 160 MG tablet; Take 1 tablet by mouth daily  -     gabapentin (NEURONTIN) 100 mg capsule; Take 1 capsule by mouth 2 (two) times a day  -     tamsulosin (FLOMAX) 0 4 mg; Take by mouth daily  -     lisinopril-hydrochlorothiazide (PRINZIDE,ZESTORETIC) 20-12 5 MG per tablet;  Take 1 tablet by mouth daily  -     Multiple Vitamins-Minerals (MULTI FOR HIM 50+) TABS; Take 1 tablet by mouth daily  -     Saw [place] 160 MG TABS; Take by mouth  - Cholecalciferol (VITAMIN D3) 5000 units CAPS; Take 1 tablet by mouth daily          Subjective:      Patient ID: Mirtah Jain is a 69 y o  male  Hypertension   Patient is a 69 y o  male who presents for follow-up of hypertension  His high blood pressure was first [id number]  Home blood pressure readings: not doing  Salt intake and diet: salt shaker on table  Usual weight: 228  Associated signs and symptoms: none  Patient denies: blurred vision, chest pain, dyspnea, headache and orthopnea  Use of agents associated with hypertension: none  Medication compliance: taking as prescribed  Neuropathy  He describes symptoms of numbness and burning  Onset of symptoms was around 2015  Symptoms are currently of mild severity  Symptoms occur constantly  The patient denies numbness and burning  Symptoms are symmetric  He also describes autonomic symptoms of  none  Previous treatment has included gabapentin, which has improved symptoms  Elevated fasting glucose  Patient had a fasting glucose of 108  He eats a diet that is moderate-high in carbohydates incvluding sandwihchs cakes, and pasta especially  He does not exercise  No diagnosis of diabetes, he does have a hisotyr of neuropathy  Dyslipidemia  Patient presents for evaluation of lipids  Compliance with treatment thus far has not required medication  A repeat fasting lipid profile was done  The patient does not use medications that may worsen dyslipidemias (corticosteroids, progestins, anabolic steroids, diuretics, beta-blockers, amiodarone, cyclosporine, olanzapine)  He takes fenofibrate 160mg po daily  The patient does not exercise    Chronic Kidney Disease  Patient is being treated for HTN and is complaint with treatment  He is prediabetic  He has had not had any flank pain, discolored urine, or oligouria since last visit      Abdominal Mass  Patint has history of abdominal mass, he was seen by Dr Clare Gonzales ([facility]) he does not have pain, constipation, diarrhea, or change in appetite  Colon cancer screening  Patient had colonoscopy in the past, but is not sure if it has been 10 or more years since it was done  The following portions of the patient's history were reviewed and updated as appropriate:   He  has a past medical history of Displacement of lumbar intervertebral disc without myelopathy and Stenosis, spinal, lumbar  He  does not have any pertinent problems on file  He  has a past surgical history that includes Cholecystectomy  His family history includes Dialysis in his mother; Heart disease in his father; Kidney disease in his mother  He  reports that he has quit smoking  He has never used smokeless tobacco  He reports that he drinks alcohol  His drug history is not on file  Current Outpatient Prescriptions   Medication Sig Dispense Refill    aspirin 81 mg chewable tablet Chew Daily      Cholecalciferol (VITAMIN D3) 5000 units CAPS Take 1 tablet by mouth daily      cyclobenzaprine (FLEXERIL) 10 mg tablet Take 1 tablet by mouth      fenofibrate (TRIGLIDE) 160 MG tablet Take 1 tablet by mouth daily      gabapentin (NEURONTIN) 100 mg capsule Take 1 capsule by mouth 2 (two) times a day      lisinopril-hydrochlorothiazide (PRINZIDE,ZESTORETIC) 20-12 5 MG per tablet Take 1 tablet by mouth daily      Multiple Vitamins-Minerals (MULTI FOR HIM 50+) TABS Take 1 tablet by mouth daily      Saw [place] 160 MG TABS Take by mouth      tamsulosin (FLOMAX) 0 4 mg Take by mouth daily       No current facility-administered medications for this visit  He has No Known Allergies       Review of Systems   Constitutional: Negative for activity change, appetite change and fatigue  Respiratory: Negative for cough, shortness of breath and wheezing  Cardiovascular: Negative for chest pain, palpitations and leg swelling  Gastrointestinal: Negative for abdominal distention and abdominal pain  Endocrine: Negative for polydipsia, polyphagia and polyuria  Genitourinary: Negative for difficulty urinating, frequency and hematuria  Neurological: Positive for numbness  Negative for dizziness and weakness  Objective:    Vitals:    02/16/18 0812   BP: 122/78   Pulse: 78   Temp: 97 8 °F (36 6 °C)   SpO2: 98%        Physical Exam   Constitutional: He is oriented to person, place, and time  He appears well-developed and well-nourished  No distress  Cardiovascular: Normal rate, regular rhythm and normal heart sounds  Pulmonary/Chest: Effort normal and breath sounds normal  No respiratory distress  He has no wheezes  Abdominal: Soft  Bowel sounds are normal  He exhibits no distension  Patient has a soft bulging mass in the midline of the abdomen  Patient states he has been by Dr Jazmine Cooper (GI surgeon) who told him is benign and nothing to be concerned about at this time  Neurological: He is alert and oriented to person, place, and time  Paitnet has numbness burining and a loss of sensation on bilateral feet   Skin: Skin is warm and dry  Psychiatric: He has a normal mood and affect   His behavior is normal  Judgment and thought content normal

## 2018-03-02 DIAGNOSIS — E78.5 HYPERLIPIDEMIA, UNSPECIFIED HYPERLIPIDEMIA TYPE: ICD-10-CM

## 2018-03-02 DIAGNOSIS — I10 HYPERTENSION, UNSPECIFIED TYPE: Primary | ICD-10-CM

## 2018-03-02 RX ORDER — FENOFIBRATE 160 MG/1
160 TABLET ORAL DAILY
Qty: 90 TABLET | Refills: 2 | Status: SHIPPED | OUTPATIENT
Start: 2018-03-02 | End: 2018-11-29 | Stop reason: SDUPTHER

## 2018-03-02 RX ORDER — LISINOPRIL AND HYDROCHLOROTHIAZIDE 20; 12.5 MG/1; MG/1
1 TABLET ORAL DAILY
Qty: 90 TABLET | Refills: 2 | Status: SHIPPED | OUTPATIENT
Start: 2018-03-02 | End: 2018-11-29 | Stop reason: SDUPTHER

## 2018-04-25 ENCOUNTER — TELEPHONE (OUTPATIENT)
Dept: NEUROLOGY | Facility: CLINIC | Age: 68
End: 2018-04-25

## 2018-07-13 RX ORDER — ACETAMINOPHEN 325 MG/1
2 TABLET ORAL DAILY PRN
COMMUNITY

## 2018-07-13 RX ORDER — MELOXICAM 15 MG/1
TABLET ORAL DAILY PRN
COMMUNITY
End: 2018-07-18

## 2018-07-13 RX ORDER — LIDOCAINE 50 MG/G
1 PATCH TOPICAL
COMMUNITY
Start: 2018-01-22 | End: 2018-07-18

## 2018-07-16 ENCOUNTER — LAB (OUTPATIENT)
Dept: LAB | Facility: CLINIC | Age: 68
End: 2018-07-16
Payer: MEDICARE

## 2018-07-16 DIAGNOSIS — R73.01 ELEVATED FASTING BLOOD SUGAR: ICD-10-CM

## 2018-07-16 DIAGNOSIS — E78.1 HIGH BLOOD TRIGLYCERIDES: ICD-10-CM

## 2018-07-16 LAB
ALBUMIN SERPL BCP-MCNC: 4 G/DL (ref 3.5–5)
ALP SERPL-CCNC: 34 U/L (ref 46–116)
ALT SERPL W P-5'-P-CCNC: 28 U/L (ref 12–78)
ANION GAP SERPL CALCULATED.3IONS-SCNC: 5 MMOL/L (ref 4–13)
AST SERPL W P-5'-P-CCNC: 18 U/L (ref 5–45)
BILIRUB SERPL-MCNC: 0.63 MG/DL (ref 0.2–1)
BUN SERPL-MCNC: 22 MG/DL (ref 5–25)
CALCIUM SERPL-MCNC: 9.4 MG/DL (ref 8.3–10.1)
CHLORIDE SERPL-SCNC: 105 MMOL/L (ref 100–108)
CHOLEST SERPL-MCNC: 156 MG/DL (ref 50–200)
CO2 SERPL-SCNC: 28 MMOL/L (ref 21–32)
CREAT SERPL-MCNC: 1.26 MG/DL (ref 0.6–1.3)
CREAT UR-MCNC: 48.6 MG/DL
EST. AVERAGE GLUCOSE BLD GHB EST-MCNC: 105 MG/DL
GFR SERPL CREATININE-BSD FRML MDRD: 58 ML/MIN/1.73SQ M
GLUCOSE P FAST SERPL-MCNC: 93 MG/DL (ref 65–99)
HBA1C MFR BLD: 5.3 % (ref 4.2–6.3)
HDLC SERPL-MCNC: 39 MG/DL (ref 40–60)
LDLC SERPL CALC-MCNC: 94 MG/DL (ref 0–100)
MICROALBUMIN UR-MCNC: 5.1 MG/L (ref 0–20)
MICROALBUMIN/CREAT 24H UR: 10 MG/G CREATININE (ref 0–30)
NONHDLC SERPL-MCNC: 117 MG/DL
POTASSIUM SERPL-SCNC: 4 MMOL/L (ref 3.5–5.3)
PROT SERPL-MCNC: 7.2 G/DL (ref 6.4–8.2)
SODIUM SERPL-SCNC: 138 MMOL/L (ref 136–145)
TRIGL SERPL-MCNC: 114 MG/DL

## 2018-07-16 PROCEDURE — 82043 UR ALBUMIN QUANTITATIVE: CPT | Performed by: FAMILY MEDICINE

## 2018-07-16 PROCEDURE — 36415 COLL VENOUS BLD VENIPUNCTURE: CPT

## 2018-07-16 PROCEDURE — 83036 HEMOGLOBIN GLYCOSYLATED A1C: CPT

## 2018-07-16 PROCEDURE — 80053 COMPREHEN METABOLIC PANEL: CPT

## 2018-07-16 PROCEDURE — 82570 ASSAY OF URINE CREATININE: CPT | Performed by: FAMILY MEDICINE

## 2018-07-16 PROCEDURE — 80061 LIPID PANEL: CPT

## 2018-07-18 ENCOUNTER — OFFICE VISIT (OUTPATIENT)
Dept: NEUROLOGY | Facility: CLINIC | Age: 68
End: 2018-07-18
Payer: MEDICARE

## 2018-07-18 VITALS
HEART RATE: 67 BPM | BODY MASS INDEX: 29.12 KG/M2 | WEIGHT: 208 LBS | SYSTOLIC BLOOD PRESSURE: 102 MMHG | DIASTOLIC BLOOD PRESSURE: 70 MMHG | HEIGHT: 71 IN

## 2018-07-18 DIAGNOSIS — S29.019D STRAIN OF THORACIC REGION, SUBSEQUENT ENCOUNTER: ICD-10-CM

## 2018-07-18 DIAGNOSIS — M51.26 DISPLACEMENT OF LUMBAR INTERVERTEBRAL DISC WITHOUT MYELOPATHY: ICD-10-CM

## 2018-07-18 DIAGNOSIS — G25.81 RLS (RESTLESS LEGS SYNDROME): Primary | ICD-10-CM

## 2018-07-18 PROBLEM — S29.012A STRAIN OF THORACIC SPINE: Status: ACTIVE | Noted: 2018-07-18

## 2018-07-18 PROCEDURE — 99213 OFFICE O/P EST LOW 20 MIN: CPT | Performed by: PSYCHIATRY & NEUROLOGY

## 2018-07-18 RX ORDER — GABAPENTIN 100 MG/1
100 CAPSULE ORAL 2 TIMES DAILY
Qty: 60 CAPSULE | Refills: 6 | Status: SHIPPED | OUTPATIENT
Start: 2018-07-18 | End: 2019-02-21 | Stop reason: SDUPTHER

## 2018-07-18 NOTE — PROGRESS NOTES
Progress Note - Neurology   Lora Wolff 69 y o  male MRN: [id number]  Unit/Bed#:  Encounter: [id number]      Subjective:   Patient is here for a follow-up visit with a history of chronic low back pain, neck pain, restless leg syndrome and has been doing well with relief of back and neck pain at this time  He is on a home exercise program but continues to experience symptoms of restless leg syndrome which is generally under control with the help of gabapentin 100 mg 2 times a day  Occasionally still continues to have symptoms of paresthesias in both feet  Denies any new neurological symptoms and overall has been doing well claims his cholesterol is also under good control as well as his renal function is back to normal     ROS:   Review of Systems   Constitutional: Positive for appetite change  HENT: Negative  Eyes: Negative  Respiratory: Negative  Cardiovascular: Negative  Gastrointestinal: Negative  Endocrine: Positive for cold intolerance  Genitourinary: Positive for frequency  Musculoskeletal: Positive for back pain  Skin: Negative  Allergic/Immunologic: Negative  Neurological: Positive for dizziness  Hematological: Negative  Psychiatric/Behavioral: Negative  Vitals:   Vitals:    07/18/18 1423   BP: 102/70   Pulse: 67   ,Body mass index is 29 22 kg/m²      MEDS:      Current Outpatient Prescriptions:     acetaminophen (TYLENOL) 325 mg tablet, Take 2 tablets by mouth daily as needed, Disp: , Rfl:     aspirin 81 mg chewable tablet, Chew Daily, Disp: , Rfl:     Cholecalciferol (VITAMIN D3) 5000 units CAPS, Take 1 tablet by mouth daily, Disp: , Rfl:     fenofibrate (TRIGLIDE) 160 MG tablet, Take 1 tablet (160 mg total) by mouth daily, Disp: 90 tablet, Rfl: 2    gabapentin (NEURONTIN) 100 mg capsule, Take 1 capsule by mouth 2 (two) times a day, Disp: , Rfl:     GLUCOSAMINE CHONDROITIN COMPLX PO, Take 2 tablets by mouth daily, Disp: , Rfl:    lisinopril-hydrochlorothiazide (PRINZIDE,ZESTORETIC) 20-12 5 MG per tablet, Take 1 tablet by mouth daily, Disp: 90 tablet, Rfl: 2    Multiple Vitamins-Minerals (MULTI FOR HIM 50+) TABS, Take 1 tablet by mouth daily, Disp: , Rfl:     Saw [place]met[place] 160 MG TABS, Take by mouth, Disp: , Rfl:     tamsulosin (FLOMAX) 0 4 mg, Take by mouth daily, Disp: , Rfl:   :    Physical Exam:  General appearance: alert, appears stated age and cooperative  Head: Normocephalic, without obvious abnormality, atraumatic    Neurologic:  On examination he has no evidence of any significant cervical or lumbosacral tenderness, and no new deficits were noted on motor and sensory exam   His gait is normal based  Lab Results: I have personally reviewed pertinent reports  Imaging Studies: I have personally reviewed pertinent reports  Assessment:  1  Chronic lumbosacral strain  Improved  2  Restless leg syndrome  3  Thoracic strain which is resolved  Plan:  Continue gabapentin 100 mg twice a day, home exercise program is encouraged and he will now return back to see me in 6 months  7/18/2018,2:31 PM    Dictation voice to text software has been used in the creation of this document  Please consider this in light of any contextual or grammatical errors

## 2018-07-18 NOTE — PROGRESS NOTES
Labs have been reviewed and are wnl  A1C is now normal  Lipid panel is improved  He does have some mild chronic kidney disease  Will need to keep monitoring this with labs every 4-6 months  Avoid using NSAIDS such as Ibuprofen or Advil  Follow up as scheduled

## 2018-11-29 ENCOUNTER — OFFICE VISIT (OUTPATIENT)
Dept: FAMILY MEDICINE CLINIC | Facility: CLINIC | Age: 68
End: 2018-11-29
Payer: MEDICARE

## 2018-11-29 VITALS
DIASTOLIC BLOOD PRESSURE: 64 MMHG | HEIGHT: 71 IN | OXYGEN SATURATION: 100 % | WEIGHT: 212 LBS | HEART RATE: 72 BPM | BODY MASS INDEX: 29.68 KG/M2 | TEMPERATURE: 98.1 F | SYSTOLIC BLOOD PRESSURE: 132 MMHG

## 2018-11-29 DIAGNOSIS — Z12.5 PROSTATE CANCER SCREENING: ICD-10-CM

## 2018-11-29 DIAGNOSIS — E78.5 HYPERLIPIDEMIA, UNSPECIFIED HYPERLIPIDEMIA TYPE: ICD-10-CM

## 2018-11-29 DIAGNOSIS — M54.50 CHRONIC MIDLINE LOW BACK PAIN WITHOUT SCIATICA: Primary | ICD-10-CM

## 2018-11-29 DIAGNOSIS — G89.29 CHRONIC MIDLINE LOW BACK PAIN WITHOUT SCIATICA: Primary | ICD-10-CM

## 2018-11-29 DIAGNOSIS — E55.9 VITAMIN D DEFICIENCY: ICD-10-CM

## 2018-11-29 DIAGNOSIS — Z13.1 DIABETES MELLITUS SCREENING: ICD-10-CM

## 2018-11-29 DIAGNOSIS — I10 HYPERTENSION, UNSPECIFIED TYPE: ICD-10-CM

## 2018-11-29 PROCEDURE — 99214 OFFICE O/P EST MOD 30 MIN: CPT | Performed by: FAMILY MEDICINE

## 2018-11-29 RX ORDER — FENOFIBRATE 160 MG/1
160 TABLET ORAL DAILY
Qty: 90 TABLET | Refills: 1 | Status: SHIPPED | OUTPATIENT
Start: 2018-11-29 | End: 2018-12-03 | Stop reason: SDUPTHER

## 2018-11-29 RX ORDER — LISINOPRIL AND HYDROCHLOROTHIAZIDE 20; 12.5 MG/1; MG/1
1 TABLET ORAL DAILY
Qty: 90 TABLET | Refills: 1 | Status: SHIPPED | OUTPATIENT
Start: 2018-11-29 | End: 2018-12-03 | Stop reason: SDUPTHER

## 2018-11-29 NOTE — PROGRESS NOTES
Assessment/Plan:    No problem-specific Assessment & Plan notes found for this encounter  Diagnoses and all orders for this visit:    Chronic midline low back pain without sciatica  advised to take Tylenol arthritis and follow up with Physical therapy  -     Ambulatory referral to Physical Therapy; Future    Hypertension, unspecified type  Stable controlled continue same medication and dose  -     lisinopril-hydrochlorothiazide (PRINZIDE,ZESTORETIC) 20-12 5 MG per tablet; Take 1 tablet by mouth daily    Diabetes mellitus screening  -     Comprehensive metabolic panel; Future    Prostate cancer screening  -     PSA, Total Screen; Future    Vitamin D deficiency  -     Vitamin D 25 hydroxy; Future    Hyperlipidemia, unspecified hyperlipidemia type  -     fenofibrate (TRIGLIDE) 160 MG tablet; Take 1 tablet (160 mg total) by mouth daily for 90 days      Follow up in 6 months to 1 year    Subjective:      Patient ID: Bartolome Blanco is a 77 y o  male  Patient is here to follow-up for chronic medical problems  He has a history of hypertension he denies any symptoms at this time such as headaches or changes in vision  He does take his medications daily and denies any side effects from that  He said that his blood pressure is generally controlled at home  He is also here because he has been developing some low back pain non radiating sharp in nature exacerbated by movement and lifting things occurs almost daily  He denies any recent injuries or surgeries related to his lower back  He also has a history of impaired fasting glucose which was normal from his most recent blood work done in June, hemoglobin A1c 5 2  He also has a history of elevated PSA above 4 which was done about a year ago  Used to see Dr Cammy Sierra, urologist however he has not seen him in the last 1 year  He denies any symptoms at this time such as difficulty urinating hematuria or straining when he urinates    He also has a history of hypertriglyceridemia he has changed his diet and tried lifestyle modifications and also takes fenofibrate daily  He denies any side effects from the medications  Also has a history of Vit D deneis any symptms        The following portions of the patient's history were reviewed and updated as appropriate:   He  has a past medical history of Anxiety; Cervical radiculopathy; Chronic kidney disease (CKD), active medical management without dialysis, stage 2 (mild); Displacement of lumbar intervertebral disc without myelopathy; Hyperglycemia; Hyperlipidemia; Hypertension; Hypertriglyceridemia; Joint pain; Lumbosacral strain; Peripheral neuropathy; RLS (restless legs syndrome); Stenosis, spinal, lumbar; Strain of thoracic spine; Testicle pain; and Vitamin D deficiency  He   Patient Active Problem List    Diagnosis Date Noted    Chronic midline low back pain without sciatica 11/29/2018    Diabetes mellitus screening 11/29/2018    Prostate cancer screening 11/29/2018    Vitamin D deficiency 11/29/2018    Hyperlipidemia 11/29/2018    RLS (restless legs syndrome) 07/18/2018    Strain of thoracic spine 07/18/2018    Displacement of lumbar intervertebral disc without myelopathy 07/18/2018    Elevated fasting blood sugar 02/16/2018    High blood triglycerides 02/16/2018    Hypertension 02/16/2018    Kidney disease 02/16/2018    Neuropathy 02/16/2018    Screening for colon cancer 87/36/4396    Periumbilical mass 95/96/7069     He  has a past surgical history that includes Cholecystectomy  His family history includes Dialysis in his mother; Heart disease in his father; Kidney disease in his mother  He  reports that he has quit smoking  He has never used smokeless tobacco  He reports that he drinks alcohol  He reports that he does not use drugs    Current Outpatient Prescriptions   Medication Sig Dispense Refill    acetaminophen (TYLENOL) 325 mg tablet Take 2 tablets by mouth daily as needed      aspirin 81 mg chewable tablet Chew Daily      Cholecalciferol (VITAMIN D3) 5000 units CAPS Take 1 tablet by mouth daily      fenofibrate (TRIGLIDE) 160 MG tablet Take 1 tablet (160 mg total) by mouth daily for 90 days 90 tablet 1    gabapentin (NEURONTIN) 100 mg capsule Take 1 capsule (100 mg total) by mouth 2 (two) times a day 60 capsule 6    GLUCOSAMINE CHONDROITIN COMPLX PO Take 2 tablets by mouth daily      lisinopril-hydrochlorothiazide (PRINZIDE,ZESTORETIC) 20-12 5 MG per tablet Take 1 tablet by mouth daily 90 tablet 1    Multiple Vitamins-Minerals (MULTI FOR HIM 50+) TABS Take 1 tablet by mouth daily      Saw Palmetto 160 MG TABS Take by mouth      tamsulosin (FLOMAX) 0 4 mg Take by mouth daily       No current facility-administered medications for this visit  Current Outpatient Prescriptions on File Prior to Visit   Medication Sig    acetaminophen (TYLENOL) 325 mg tablet Take 2 tablets by mouth daily as needed    aspirin 81 mg chewable tablet Chew Daily    Cholecalciferol (VITAMIN D3) 5000 units CAPS Take 1 tablet by mouth daily    gabapentin (NEURONTIN) 100 mg capsule Take 1 capsule (100 mg total) by mouth 2 (two) times a day    GLUCOSAMINE CHONDROITIN COMPLX PO Take 2 tablets by mouth daily    Multiple Vitamins-Minerals (MULTI FOR HIM 50+) TABS Take 1 tablet by mouth daily    Saw Palmetto 160 MG TABS Take by mouth    tamsulosin (FLOMAX) 0 4 mg Take by mouth daily    [DISCONTINUED] fenofibrate (TRIGLIDE) 160 MG tablet Take 1 tablet (160 mg total) by mouth daily    [DISCONTINUED] lisinopril-hydrochlorothiazide (PRINZIDE,ZESTORETIC) 20-12 5 MG per tablet Take 1 tablet by mouth daily     No current facility-administered medications on file prior to visit  He is allergic to statins       Review of Systems   Constitutional: Negative for activity change, appetite change, fatigue and fever  HENT: Negative for congestion and ear discharge  Respiratory: Negative for cough and shortness of breath  Cardiovascular: Negative for chest pain and palpitations  Gastrointestinal: Negative for diarrhea and nausea  Musculoskeletal: Positive for back pain  Negative for arthralgias  Skin: Negative for color change and rash  Neurological: Negative for dizziness and headaches  Psychiatric/Behavioral: Negative for agitation and behavioral problems  Objective:      /64   Pulse 72   Temp 98 1 °F (36 7 °C)   Ht 5' 10 75" (1 797 m)   Wt 96 2 kg (212 lb)   SpO2 100%   BMI 29 78 kg/m²          Physical Exam   Constitutional: He is oriented to person, place, and time  He appears well-developed and well-nourished  No distress  Eyes: Pupils are equal, round, and reactive to light  No scleral icterus  Cardiovascular: Normal rate, regular rhythm and normal heart sounds  No murmur heard  Pulmonary/Chest: Effort normal and breath sounds normal  No respiratory distress  He has no wheezes  Abdominal: Soft  Bowel sounds are normal  He exhibits no distension  There is no tenderness  Neurological: He is alert and oriented to person, place, and time  Skin: Skin is warm and dry  No rash noted  He is not diaphoretic  Psychiatric: He has a normal mood and affect

## 2018-11-30 ENCOUNTER — APPOINTMENT (OUTPATIENT)
Dept: LAB | Facility: CLINIC | Age: 68
End: 2018-11-30
Payer: MEDICARE

## 2018-11-30 DIAGNOSIS — E55.9 VITAMIN D DEFICIENCY: ICD-10-CM

## 2018-11-30 DIAGNOSIS — Z12.5 PROSTATE CANCER SCREENING: ICD-10-CM

## 2018-11-30 DIAGNOSIS — Z13.1 DIABETES MELLITUS SCREENING: ICD-10-CM

## 2018-11-30 LAB
25(OH)D3 SERPL-MCNC: 33.9 NG/ML (ref 30–100)
ALBUMIN SERPL BCP-MCNC: 4.1 G/DL (ref 3.5–5)
ALP SERPL-CCNC: 39 U/L (ref 46–116)
ALT SERPL W P-5'-P-CCNC: 31 U/L (ref 12–78)
ANION GAP SERPL CALCULATED.3IONS-SCNC: 3 MMOL/L (ref 4–13)
AST SERPL W P-5'-P-CCNC: 17 U/L (ref 5–45)
BILIRUB SERPL-MCNC: 0.52 MG/DL (ref 0.2–1)
BUN SERPL-MCNC: 22 MG/DL (ref 5–25)
CALCIUM SERPL-MCNC: 10.1 MG/DL (ref 8.3–10.1)
CHLORIDE SERPL-SCNC: 104 MMOL/L (ref 100–108)
CO2 SERPL-SCNC: 30 MMOL/L (ref 21–32)
CREAT SERPL-MCNC: 1.23 MG/DL (ref 0.6–1.3)
GFR SERPL CREATININE-BSD FRML MDRD: 59 ML/MIN/1.73SQ M
GLUCOSE P FAST SERPL-MCNC: 92 MG/DL (ref 65–99)
POTASSIUM SERPL-SCNC: 4.5 MMOL/L (ref 3.5–5.3)
PROT SERPL-MCNC: 7.4 G/DL (ref 6.4–8.2)
PSA SERPL-MCNC: 4.9 NG/ML (ref 0–4)
SODIUM SERPL-SCNC: 137 MMOL/L (ref 136–145)

## 2018-11-30 PROCEDURE — G0103 PSA SCREENING: HCPCS

## 2018-11-30 PROCEDURE — 36415 COLL VENOUS BLD VENIPUNCTURE: CPT

## 2018-11-30 PROCEDURE — 80053 COMPREHEN METABOLIC PANEL: CPT

## 2018-11-30 PROCEDURE — 82306 VITAMIN D 25 HYDROXY: CPT

## 2018-12-03 DIAGNOSIS — E78.5 HYPERLIPIDEMIA, UNSPECIFIED HYPERLIPIDEMIA TYPE: ICD-10-CM

## 2018-12-03 DIAGNOSIS — I10 HYPERTENSION, UNSPECIFIED TYPE: ICD-10-CM

## 2018-12-03 RX ORDER — LISINOPRIL AND HYDROCHLOROTHIAZIDE 20; 12.5 MG/1; MG/1
1 TABLET ORAL DAILY
Qty: 90 TABLET | Refills: 0 | Status: SHIPPED | OUTPATIENT
Start: 2018-12-03 | End: 2018-12-14 | Stop reason: SDUPTHER

## 2018-12-03 RX ORDER — FENOFIBRATE 160 MG/1
160 TABLET ORAL DAILY
Qty: 90 TABLET | Refills: 0 | Status: SHIPPED | OUTPATIENT
Start: 2018-12-03 | End: 2018-12-14 | Stop reason: SDUPTHER

## 2018-12-14 DIAGNOSIS — E78.5 HYPERLIPIDEMIA, UNSPECIFIED HYPERLIPIDEMIA TYPE: ICD-10-CM

## 2018-12-14 DIAGNOSIS — I10 HYPERTENSION, UNSPECIFIED TYPE: ICD-10-CM

## 2018-12-14 RX ORDER — LISINOPRIL AND HYDROCHLOROTHIAZIDE 20; 12.5 MG/1; MG/1
TABLET ORAL
Qty: 90 TABLET | Refills: 2 | Status: SHIPPED | OUTPATIENT
Start: 2018-12-14 | End: 2019-05-23 | Stop reason: SDUPTHER

## 2018-12-14 RX ORDER — FENOFIBRATE 160 MG/1
TABLET ORAL
Qty: 90 TABLET | Refills: 2 | Status: SHIPPED | OUTPATIENT
Start: 2018-12-14 | End: 2019-12-02 | Stop reason: SDUPTHER

## 2019-02-21 ENCOUNTER — OFFICE VISIT (OUTPATIENT)
Dept: NEUROLOGY | Facility: CLINIC | Age: 69
End: 2019-02-21
Payer: MEDICARE

## 2019-02-21 VITALS
BODY MASS INDEX: 30.66 KG/M2 | SYSTOLIC BLOOD PRESSURE: 124 MMHG | HEIGHT: 71 IN | DIASTOLIC BLOOD PRESSURE: 70 MMHG | WEIGHT: 219 LBS | HEART RATE: 70 BPM

## 2019-02-21 DIAGNOSIS — G25.81 RLS (RESTLESS LEGS SYNDROME): ICD-10-CM

## 2019-02-21 PROCEDURE — 99213 OFFICE O/P EST LOW 20 MIN: CPT | Performed by: PSYCHIATRY & NEUROLOGY

## 2019-02-21 RX ORDER — GABAPENTIN 100 MG/1
100 CAPSULE ORAL 2 TIMES DAILY
Qty: 180 CAPSULE | Refills: 3 | Status: SHIPPED | OUTPATIENT
Start: 2019-02-21 | End: 2020-01-03 | Stop reason: SDUPTHER

## 2019-02-21 NOTE — PROGRESS NOTES
Progress Note - Neurology   Rosemary Garner 77 y o  male MRN: [id number]  Unit/Bed#:  Encounter: [id number]      Subjective:   Patient is here for a follow-up visit with a history of chronic neck and low back pain which has improved significantly except for limited range of motion in the neck, associated with a crackling sound, patient is not been on his home exercises in the recent past   His restless leg symptoms also under control with the help of gabapentin 100 mg twice a day  He denies any motor or sensory symptoms in the upper lower extremities  ROS:   Review of Systems   Constitutional: Negative  Negative for appetite change and fever  HENT: Negative  Negative for hearing loss, tinnitus, trouble swallowing and voice change  Eyes: Negative  Negative for photophobia, pain and visual disturbance  Respiratory: Negative  Negative for shortness of breath  Cardiovascular: Negative  Negative for palpitations  Gastrointestinal: Negative  Negative for nausea and vomiting  Endocrine: Negative for heat intolerance  Genitourinary: Positive for difficulty urinating  Negative for dysuria, frequency and urgency  Musculoskeletal: Positive for back pain and gait problem  Negative for myalgias and neck pain  Skin: Negative  Negative for rash  Neurological: Positive for weakness and numbness  Negative for dizziness, tremors, seizures, syncope, facial asymmetry, speech difficulty, light-headedness and headaches  Hematological: Negative  Does not bruise/bleed easily  Psychiatric/Behavioral: Negative  Negative for confusion, hallucinations and sleep disturbance  Vitals:   Vitals:    02/21/19 1556   BP: 124/70   BP Location: Left arm   Patient Position: Sitting   Cuff Size: Large   Pulse: 70   Weight: 99 3 kg (219 lb)   Height: 5' 10 75" (1 797 m)   ,Body mass index is 30 76 kg/m²      MEDS:      Current Outpatient Medications:     acetaminophen (TYLENOL) 325 mg tablet, Take 2 tablets by mouth daily as needed, Disp: , Rfl:     aspirin 81 mg chewable tablet, Chew 243 mg daily as needed , Disp: , Rfl:     Cholecalciferol (VITAMIN D3) 5000 units CAPS, Take 1 tablet by mouth daily, Disp: , Rfl:     fenofibrate (TRIGLIDE) 160 MG tablet, TAKE 1 TABLET EVERY DAY, Disp: 90 tablet, Rfl: 2    gabapentin (NEURONTIN) 100 mg capsule, Take 1 capsule (100 mg total) by mouth 2 (two) times a day, Disp: 60 capsule, Rfl: 6    GLUCOSAMINE CHONDROITIN COMPLX PO, Take 2 tablets by mouth daily, Disp: , Rfl:     lisinopril-hydrochlorothiazide (PRINZIDE,ZESTORETIC) 20-12 5 MG per tablet, TAKE 1 TABLET EVERY DAY, Disp: 90 tablet, Rfl: 2    Multiple Vitamins-Minerals (MULTI FOR HIM 50+) TABS, Take 1 tablet by mouth daily, Disp: , Rfl:     Saw Palmetto 160 MG TABS, Take by mouth, Disp: , Rfl:     tamsulosin (FLOMAX) 0 4 mg, Take by mouth daily, Disp: , Rfl:   :    Physical Exam:  General appearance: alert, appears stated age and cooperative  Head: Normocephalic, without obvious abnormality, atraumatic    On examination there is no evidence of any cervical or lumbosacral tenderness, no evidence of any new cranial nerve deficit, motor or sensory deficits in the upper lower extremities  No evidence of any dysmetria, his gait is normal base, and there are no bruits appreciable in the neck  Lab Results: I have personally reviewed pertinent reports  Imaging Studies: I have personally reviewed pertinent reports  Assessment:  1  Restless leg syndrome  Plan:  Patient will continue with gabapentin 100 mg twice a day, also advised to continue home exercise program, and will return back to see me in 6 months     2/21/2019,4:03 PM    Dictation voice to text software has been used in the creation of this document  Please consider this in light of any contextual or grammatical errors

## 2019-05-23 DIAGNOSIS — I10 HYPERTENSION, UNSPECIFIED TYPE: ICD-10-CM

## 2019-05-23 RX ORDER — LISINOPRIL AND HYDROCHLOROTHIAZIDE 20; 12.5 MG/1; MG/1
TABLET ORAL
Qty: 90 TABLET | Refills: 0 | Status: SHIPPED | OUTPATIENT
Start: 2019-05-23 | End: 2020-02-25

## 2019-12-02 DIAGNOSIS — E78.5 HYPERLIPIDEMIA, UNSPECIFIED HYPERLIPIDEMIA TYPE: ICD-10-CM

## 2019-12-02 RX ORDER — FENOFIBRATE 160 MG/1
160 TABLET ORAL DAILY
Qty: 90 TABLET | Refills: 2 | Status: SHIPPED | OUTPATIENT
Start: 2019-12-02 | End: 2020-08-21

## 2019-12-26 ENCOUNTER — OFFICE VISIT (OUTPATIENT)
Dept: FAMILY MEDICINE CLINIC | Facility: CLINIC | Age: 69
End: 2019-12-26
Payer: MEDICARE

## 2019-12-26 VITALS
DIASTOLIC BLOOD PRESSURE: 74 MMHG | SYSTOLIC BLOOD PRESSURE: 128 MMHG | RESPIRATION RATE: 16 BRPM | BODY MASS INDEX: 31.95 KG/M2 | HEIGHT: 71 IN | WEIGHT: 228.2 LBS | OXYGEN SATURATION: 98 % | TEMPERATURE: 98 F | HEART RATE: 74 BPM

## 2019-12-26 DIAGNOSIS — Z23 NEED FOR PNEUMOCOCCAL VACCINATION: ICD-10-CM

## 2019-12-26 DIAGNOSIS — M62.08 DIASTASIS RECTI: ICD-10-CM

## 2019-12-26 DIAGNOSIS — Z13.1 DIABETES MELLITUS SCREENING: ICD-10-CM

## 2019-12-26 DIAGNOSIS — R05.9 COUGH: Primary | ICD-10-CM

## 2019-12-26 DIAGNOSIS — E78.5 HYPERLIPIDEMIA, UNSPECIFIED HYPERLIPIDEMIA TYPE: ICD-10-CM

## 2019-12-26 DIAGNOSIS — Z00.00 MEDICARE ANNUAL WELLNESS VISIT, SUBSEQUENT: ICD-10-CM

## 2019-12-26 DIAGNOSIS — Z13.6 SCREENING FOR ABDOMINAL AORTIC ANEURYSM: ICD-10-CM

## 2019-12-26 DIAGNOSIS — Z11.59 NEED FOR HEPATITIS C SCREENING TEST: ICD-10-CM

## 2019-12-26 PROCEDURE — G0439 PPPS, SUBSEQ VISIT: HCPCS | Performed by: FAMILY MEDICINE

## 2019-12-26 PROCEDURE — 99213 OFFICE O/P EST LOW 20 MIN: CPT | Performed by: FAMILY MEDICINE

## 2019-12-26 PROCEDURE — 90670 PCV13 VACCINE IM: CPT

## 2019-12-26 PROCEDURE — G0009 ADMIN PNEUMOCOCCAL VACCINE: HCPCS

## 2019-12-26 RX ORDER — FLUTICASONE PROPIONATE 50 MCG
1 SPRAY, SUSPENSION (ML) NASAL DAILY
Qty: 1 BOTTLE | Refills: 1 | Status: SHIPPED | OUTPATIENT
Start: 2019-12-26

## 2019-12-26 RX ORDER — DEXTROMETHORPHAN HYDROBROMIDE AND PROMETHAZINE HYDROCHLORIDE 15; 6.25 MG/5ML; MG/5ML
5 SOLUTION ORAL 4 TIMES DAILY PRN
Qty: 118 ML | Refills: 1 | Status: SHIPPED | OUTPATIENT
Start: 2019-12-26 | End: 2020-01-03 | Stop reason: SDDI

## 2019-12-26 NOTE — PATIENT INSTRUCTIONS

## 2019-12-26 NOTE — PROGRESS NOTES
Assessment/Plan:    No problem-specific Assessment & Plan notes found for this encounter  Diagnoses and all orders for this visit:    Cough  -     fluticasone (FLONASE) 50 mcg/act nasal spray; 1 spray into each nostril daily  -     loratadine-pseudoephedrine (CLARITIN-D 12-HOUR) 5-120 mg per tablet; Take 1 tablet by mouth 2 (two) times a day for 5 days  -     Promethazine-DM (PHENERGAN-DM) 6 25-15 mg/5 mL oral syrup; Take 5 mL by mouth 4 (four) times a day as needed for cough    Need for pneumococcal vaccination  -     PNEUMOCOCCAL CONJUGATE VACCINE 13-VALENT GREATER THAN 6 MONTHS    Diabetes mellitus screening  -     Comprehensive metabolic panel; Future    Hyperlipidemia, unspecified hyperlipidemia type  -     Lipid panel; Future    Screening for abdominal aortic aneurysm  -      abdominal aorta screening aaa; Future    Need for hepatitis C screening test  -     Hepatitis C antibody; Future    Medicare annual wellness visit, subsequent  See Medicare wellness visit  Diastasis recti  Recommend an appt with General surgery however he is not interested  Follow up in 1 year or as needed        Subjective:      Patient ID: Michaela Peñas is a 68 y o  male  Cough  Patient complains of nasal congestion and nonproductive cough  Symptoms began several months ago  Symptoms have been unchanged since that time  The cough is nonproductive and is aggravated by nothing  Associated symptoms include: postnasal drip  Patient does have a history of smoking, smoked for 25 years  Also he has bulge on his epigastric area as well as abdomen  He denies any pain or discomfort associated with it however         The following portions of the patient's history were reviewed and updated as appropriate:   He  has a past medical history of Anxiety, Cervical radiculopathy, Chronic kidney disease (CKD), active medical management without dialysis, stage 2 (mild), Displacement of lumbar intervertebral disc without myelopathy, Hyperglycemia, Hyperlipidemia, Hypertension, Hypertriglyceridemia, Joint pain, Lumbosacral strain, Peripheral neuropathy, RLS (restless legs syndrome), Stenosis, spinal, lumbar, Strain of thoracic spine, Testicle pain, and Vitamin D deficiency  He   Patient Active Problem List    Diagnosis Date Noted    Cough 12/26/2019    Need for pneumococcal vaccination 12/26/2019    Screening for abdominal aortic aneurysm 12/26/2019    Need for hepatitis C screening test 12/26/2019    Medicare annual wellness visit, subsequent 12/26/2019    Diastasis recti 12/26/2019    Chronic midline low back pain without sciatica 11/29/2018    Diabetes mellitus screening 11/29/2018    Prostate cancer screening 11/29/2018    Vitamin D deficiency 11/29/2018    Hyperlipidemia 11/29/2018    RLS (restless legs syndrome) 07/18/2018    Strain of thoracic spine 07/18/2018    Displacement of lumbar intervertebral disc without myelopathy 07/18/2018    Elevated fasting blood sugar 02/16/2018    High blood triglycerides 02/16/2018    Hypertension 02/16/2018    Kidney disease 02/16/2018    Neuropathy 02/16/2018    Screening for colon cancer 25/60/7068    Periumbilical mass 86/14/7715     He  has a past surgical history that includes Cholecystectomy  His family history includes Dialysis in his mother; Heart disease in his father; Kidney disease in his mother  He  reports that he has quit smoking  He has never used smokeless tobacco  He reports that he drank alcohol  He reports that he does not use drugs    Current Outpatient Medications   Medication Sig Dispense Refill    acetaminophen (TYLENOL) 325 mg tablet Take 2 tablets by mouth daily as needed      aspirin 81 mg chewable tablet Chew 243 mg daily as needed       Cholecalciferol (VITAMIN D3) 5000 units CAPS Take 1 tablet by mouth daily      fenofibrate (TRIGLIDE) 160 MG tablet Take 1 tablet (160 mg total) by mouth daily 90 tablet 2    gabapentin (NEURONTIN) 100 mg capsule Take 1 capsule (100 mg total) by mouth 2 (two) times a day 180 capsule 3    GLUCOSAMINE CHONDROITIN COMPLX PO Take 2 tablets by mouth daily      lisinopril-hydrochlorothiazide (PRINZIDE,ZESTORETIC) 20-12 5 MG per tablet TAKE 1 TABLET EVERY DAY 90 tablet 0    Multiple Vitamins-Minerals (MULTI FOR HIM 50+) TABS Take 1 tablet by mouth daily      Saw Palmetto 160 MG TABS Take by mouth      tamsulosin (FLOMAX) 0 4 mg Take by mouth daily      fluticasone (FLONASE) 50 mcg/act nasal spray 1 spray into each nostril daily 1 Bottle 1    loratadine-pseudoephedrine (CLARITIN-D 12-HOUR) 5-120 mg per tablet Take 1 tablet by mouth 2 (two) times a day for 5 days 10 tablet 0    Promethazine-DM (PHENERGAN-DM) 6 25-15 mg/5 mL oral syrup Take 5 mL by mouth 4 (four) times a day as needed for cough 118 mL 1     No current facility-administered medications for this visit  Current Outpatient Medications on File Prior to Visit   Medication Sig    acetaminophen (TYLENOL) 325 mg tablet Take 2 tablets by mouth daily as needed    aspirin 81 mg chewable tablet Chew 243 mg daily as needed     Cholecalciferol (VITAMIN D3) 5000 units CAPS Take 1 tablet by mouth daily    fenofibrate (TRIGLIDE) 160 MG tablet Take 1 tablet (160 mg total) by mouth daily    gabapentin (NEURONTIN) 100 mg capsule Take 1 capsule (100 mg total) by mouth 2 (two) times a day    GLUCOSAMINE CHONDROITIN COMPLX PO Take 2 tablets by mouth daily    lisinopril-hydrochlorothiazide (PRINZIDE,ZESTORETIC) 20-12 5 MG per tablet TAKE 1 TABLET EVERY DAY    Multiple Vitamins-Minerals (MULTI FOR HIM 50+) TABS Take 1 tablet by mouth daily    Saw Palmetto 160 MG TABS Take by mouth    tamsulosin (FLOMAX) 0 4 mg Take by mouth daily     No current facility-administered medications on file prior to visit  He is allergic to statins       Review of Systems   Constitutional: Negative for activity change, appetite change, fatigue and fever     HENT: Negative for congestion and ear discharge  Respiratory: Negative for cough and shortness of breath  Cardiovascular: Negative for chest pain and palpitations  Gastrointestinal: Negative for abdominal distention, abdominal pain, anal bleeding, blood in stool, constipation, diarrhea, nausea, rectal pain and vomiting  Musculoskeletal: Negative for arthralgias and back pain  Skin: Negative for color change and rash  Neurological: Negative for dizziness and headaches  Psychiatric/Behavioral: Negative for agitation and behavioral problems  Objective:      /74 (BP Location: Right arm, Patient Position: Sitting, Cuff Size: Adult)   Pulse 74   Temp 98 °F (36 7 °C) (Tympanic)   Resp 16   Ht 5' 10 75" (1 797 m)   Wt 104 kg (228 lb 3 2 oz)   SpO2 98%   BMI 32 05 kg/m²          Physical Exam   Constitutional: He is oriented to person, place, and time  He appears well-developed and well-nourished  No distress  Eyes: Pupils are equal, round, and reactive to light  No scleral icterus  Cardiovascular: Normal rate, regular rhythm and normal heart sounds  No murmur heard  Pulmonary/Chest: Effort normal and breath sounds normal  No respiratory distress  He has no wheezes  Abdominal: Soft  Bowel sounds are normal  He exhibits no distension  There is no tenderness  A hernia is present  diathesis recti noted  Also umbilical hernia noted   Neurological: He is alert and oriented to person, place, and time  Skin: Skin is warm and dry  No rash noted  He is not diaphoretic  Psychiatric: He has a normal mood and affect

## 2019-12-26 NOTE — PROGRESS NOTES
BMI Counseling: Body mass index is 32 05 kg/m²  The BMI is above normal  Nutrition recommendations include 3-5 servings of fruits/vegetables daily  Exercise recommendations include exercising 3-5 times per week  Assessment and Plan:     Problem List Items Addressed This Visit     None           Preventive health issues were discussed with patient, and age appropriate screening tests were ordered as noted in patient's After Visit Summary  Personalized health advice and appropriate referrals for health education or preventive services given if needed, as noted in patient's After Visit Summary       History of Present Illness:     Patient presents for Medicare Annual Wellness visit    Patient Care Team:  Slick Don MD as PCP - General (Family Medicine)  MD Mildred Flores MD (Urology)     Problem List:     Patient Active Problem List   Diagnosis    Elevated fasting blood sugar    High blood triglycerides    Hypertension    Kidney disease    Neuropathy    Screening for colon cancer    Periumbilical mass    RLS (restless legs syndrome)    Strain of thoracic spine    Displacement of lumbar intervertebral disc without myelopathy    Chronic midline low back pain without sciatica    Diabetes mellitus screening    Prostate cancer screening    Vitamin D deficiency    Hyperlipidemia      Past Medical and Surgical History:     Past Medical History:   Diagnosis Date    Anxiety     Cervical radiculopathy     Chronic kidney disease (CKD), active medical management without dialysis, stage 2 (mild)     Displacement of lumbar intervertebral disc without myelopathy     LAST ASSESSED [id number]    Hyperglycemia     Hyperlipidemia     Hypertension     Hypertriglyceridemia     Joint pain     Lumbosacral strain     Peripheral neuropathy     RLS (restless legs syndrome)     Stenosis, spinal, lumbar     LAST ASSESSED [id number]    Strain of thoracic spine     Testicle pain     Vitamin D deficiency      Past Surgical History:   Procedure Laterality Date    CHOLECYSTECTOMY        Family History:     Family History   Problem Relation Age of Onset    Dialysis Mother     Kidney disease Mother     Heart disease Father       Social History:     Social History     Socioeconomic History    Marital status: /Civil Union     Spouse name: Not on file    Number of children: Not on file    Years of education: Not on file    Highest education level: Not on file   Occupational History    Occupation: RETIRED    Social Needs    Financial resource strain: Not on file    Food insecurity:     Worry: Not on file     Inability: Not on file    Transportation needs:     Medical: Not on file     Non-medical: Not on file   Tobacco Use    Smoking status: Former Smoker    Smokeless tobacco: Never Used   Substance and Sexual Activity    Alcohol use: Not Currently     Comment: SOME WINE     Drug use: No    Sexual activity: Not on file   Lifestyle    Physical activity:     Days per week: Not on file     Minutes per session: Not on file    Stress: Not on file   Relationships    Social connections:     Talks on phone: Not on file     Gets together: Not on file     Attends [employer] service: Not on file     Active member of club or organization: Not on file     Attends meetings of clubs or organizations: Not on file     Relationship status: Not on file    Intimate partner violence:     Fear of current or ex partner: Not on file     Emotionally abused: Not on file     Physically abused: Not on file     Forced sexual activity: Not on file   Other Topics Concern    Not on file   Social History Narrative    CAFFEINE USE        Medications and Allergies:     Current Outpatient Medications   Medication Sig Dispense Refill    acetaminophen (TYLENOL) 325 mg tablet Take 2 tablets by mouth daily as needed      aspirin 81 mg chewable tablet Chew 243 mg daily as needed       Cholecalciferol (VITAMIN D3) 5000 units CAPS Take 1 tablet by mouth daily      fenofibrate (TRIGLIDE) 160 MG tablet Take 1 tablet (160 mg total) by mouth daily 90 tablet 2    gabapentin (NEURONTIN) 100 mg capsule Take 1 capsule (100 mg total) by mouth 2 (two) times a day 180 capsule 3    GLUCOSAMINE CHONDROITIN COMPLX PO Take 2 tablets by mouth daily      lisinopril-hydrochlorothiazide (PRINZIDE,ZESTORETIC) 20-12 5 MG per tablet TAKE 1 TABLET EVERY DAY 90 tablet 0    Multiple Vitamins-Minerals (MULTI FOR HIM 50+) TABS Take 1 tablet by mouth daily      Saw Palmetto 160 MG TABS Take by mouth      tamsulosin (FLOMAX) 0 4 mg Take by mouth daily       No current facility-administered medications for this visit  Allergies   Allergen Reactions    Statins      Other reaction(s): bone pain      Immunizations:     Immunization History   Administered Date(s) Administered    INFLUENZA 11/18/2019    Influenza Split High Dose Preservative Free IM 10/03/2018    Tdap 08/08/2015      Health Maintenance:         Topic Date Due    Hepatitis C Screening  1950    CRC Screening: Colonoscopy  11/24/2018         Topic Date Due    Pneumococcal Vaccine: 65+ Years (1 of 2 - PCV13) 11/10/2013      Medicare Health Risk Assessment:     /74 (BP Location: Right arm, Patient Position: Sitting, Cuff Size: Adult)   Pulse 74   Temp 98 °F (36 7 °C) (Tympanic)   Resp 16   Ht 5' 10 75" (1 797 m)   Wt 104 kg (228 lb 3 2 oz)   SpO2 98%   BMI 32 05 kg/m²      Tessa Correia is here for his Subsequent Wellness visit  Last Medicare Wellness visit information reviewed, patient interviewed and updates made to the record today  Health Risk Assessment:   Patient rates overall health as good  Patient feels that their physical health rating is same  Eyesight was rated as slightly worse  Hearing was rated as same  Patient feels that their emotional and mental health rating is same  Pain experienced in the last 7 days has been none   Patient states that he has experienced no weight loss or gain in last 6 months  Depression Screening:   PHQ-2 Score: 0      Fall Risk Screening: In the past year, patient has experienced: no history of falling in past year      Home Safety:  Patient does not have trouble with stairs inside or outside of their home  Patient has working smoke alarms and has no working carbon monoxide detector  Home safety hazards include: none  Nutrition:   Current diet is Regular  Medications:   Patient is currently taking over-the-counter supplements  OTC medications include: see medication list  Patient is able to manage medications  Activities of Daily Living (ADLs)/Instrumental Activities of Daily Living (IADLs):   Walk and transfer into and out of bed and chair?: Yes  Dress and groom yourself?: Yes    Bathe or shower yourself?: Yes    Feed yourself?  Yes  Do your laundry/housekeeping?: Yes  Manage your money, pay your bills and track your expenses?: Yes  Make your own meals?: Yes    Do your own shopping?: Yes    Durable Medical Equipment Suppliers  none    Previous Hospitalizations:   Any hospitalizations or ED visits within the last 12 months?: No      Advance Care Planning:     Five wishes given: Yes      PREVENTIVE SCREENINGS      Cardiovascular Screening:    General: History Lipid Disorder    Due for: Lipid Panel      Diabetes Screening:       Due for: Blood Glucose      Colorectal Cancer Screening:     General: Patient Declines      Prostate Cancer Screening:    General: Screening Current      Osteoporosis Screening:    General: Screening Not Indicated      Abdominal Aortic Aneurysm (AAA) Screening:    Risk factors include: age between 73-67 yo and tobacco use        General: Risks and Benefits Discussed    Due for: Screening AAA Ultrasound      Lung Cancer Screening:     General: Screening Not Indicated      Hepatitis C Screening:    General: Risks and Benefits Discussed    Hep C Screening Accepted: Yes        Radha Messer MD

## 2020-01-03 ENCOUNTER — OFFICE VISIT (OUTPATIENT)
Dept: NEUROLOGY | Facility: CLINIC | Age: 70
End: 2020-01-03
Payer: MEDICARE

## 2020-01-03 VITALS
HEIGHT: 71 IN | DIASTOLIC BLOOD PRESSURE: 68 MMHG | SYSTOLIC BLOOD PRESSURE: 138 MMHG | HEART RATE: 64 BPM | WEIGHT: 226 LBS | BODY MASS INDEX: 31.64 KG/M2

## 2020-01-03 DIAGNOSIS — G25.81 RLS (RESTLESS LEGS SYNDROME): ICD-10-CM

## 2020-01-03 PROCEDURE — 99213 OFFICE O/P EST LOW 20 MIN: CPT | Performed by: PSYCHIATRY & NEUROLOGY

## 2020-01-03 RX ORDER — GABAPENTIN 100 MG/1
100 CAPSULE ORAL 2 TIMES DAILY
Qty: 180 CAPSULE | Refills: 3 | Status: SHIPPED | OUTPATIENT
Start: 2020-01-03 | End: 2021-02-04 | Stop reason: SDUPTHER

## 2020-01-03 NOTE — PROGRESS NOTES
Progress Note - Neurology   Cholo Tomlinson 68 y o  male MRN: [id number]  Unit/Bed#:  Encounter: [id number]      Subjective:   Patient is here for a follow-up visit with symptoms of restless legs generally under good control with the help of gabapentin 100 mg twice a day but occasionally does experience worsening symptoms especially when he has had a strenuous day  Patient denies any neck pain or back pain at this time and overall has been maintaining himself  In the past he was also felt to have polyneuropathy however his symptoms under good control  He denies any new neurological symptoms  Patient has been experiencing a cough and at times choking while eating and is to see his PCP in the near future  ROS:   Review of Systems   Constitutional: Negative  Negative for appetite change and fever  HENT: Negative  Negative for hearing loss, tinnitus, trouble swallowing and voice change  Eyes: Negative  Negative for photophobia and pain  Respiratory: Positive for cough and choking  Negative for shortness of breath  Cardiovascular: Negative  Negative for palpitations  Gastrointestinal: Negative  Negative for nausea and vomiting  Endocrine: Negative  Negative for cold intolerance and heat intolerance  Genitourinary: Negative  Negative for dysuria, frequency and urgency  Musculoskeletal: Negative  Negative for back pain, myalgias and neck pain  Skin: Negative  Negative for rash  Neurological: Positive for numbness (feet)  Negative for dizziness, tremors, seizures, syncope, facial asymmetry, speech difficulty, weakness, light-headedness and headaches  Hematological: Negative  Does not bruise/bleed easily  Psychiatric/Behavioral: Negative  Negative for confusion, hallucinations and sleep disturbance         Vitals:   Vitals:    01/03/20 0833   BP: 138/68   BP Location: Left arm   Patient Position: Sitting   Cuff Size: Adult   Pulse: 64   Weight: 103 kg (226 lb)   Height: 5' 10 75" (1 797 m)   ,Body mass index is 31 74 kg/m²  MEDS:      Current Outpatient Medications:     acetaminophen (TYLENOL) 325 mg tablet, Take 2 tablets by mouth daily as needed, Disp: , Rfl:     Cholecalciferol (VITAMIN D3) 5000 units CAPS, Take 1 tablet by mouth daily, Disp: , Rfl:     fenofibrate (TRIGLIDE) 160 MG tablet, Take 1 tablet (160 mg total) by mouth daily, Disp: 90 tablet, Rfl: 2    fluticasone (FLONASE) 50 mcg/act nasal spray, 1 spray into each nostril daily (Patient taking differently: 1 spray into each nostril daily as needed ), Disp: 1 Bottle, Rfl: 1    gabapentin (NEURONTIN) 100 mg capsule, Take 1 capsule (100 mg total) by mouth 2 (two) times a day, Disp: 180 capsule, Rfl: 3    GLUCOSAMINE CHONDROITIN COMPLX PO, Take 2 tablets by mouth daily, Disp: , Rfl:     lisinopril-hydrochlorothiazide (PRINZIDE,ZESTORETIC) 20-12 5 MG per tablet, TAKE 1 TABLET EVERY DAY, Disp: 90 tablet, Rfl: 0    Multiple Vitamins-Minerals (MULTI FOR HIM 50+) TABS, Take 1 tablet by mouth daily, Disp: , Rfl:     Saw Palmetto 160 MG TABS, Take by mouth 2 (two) times a day , Disp: , Rfl:     tamsulosin (FLOMAX) 0 4 mg, Take by mouth daily, Disp: , Rfl:     aspirin 81 mg chewable tablet, Chew 243 mg daily as needed , Disp: , Rfl:   :    Physical Exam:  General appearance: alert, appears stated age and cooperative  Head: Normocephalic, without obvious abnormality, atraumatic    On neurological examination patient is alert awake oriented, speech is fluent, cranial nerves 2-12 intact, and on motor and sensory exam there is no evidence of any focal weakness, deep tendon reflexes are prominent in the lower extremities as compared to the upper, no dysmetria was noted ,his gait is normal based, with a negative Romberg sign  Lab Results: I have personally reviewed pertinent reports  Imaging Studies: I have personally reviewed pertinent reports  Assessment:  1   Restless leg syndrome    Plan:  Patient is advised to continue gabapentin 100 mg twice a day overall has been doing well, continue home exercises, and will now return back to see me in 1 year  1/3/2020,8:40 AM    Dictation voice to text software has been used in the creation of this document  Please consider this in light of any contextual or grammatical errors

## 2020-02-25 DIAGNOSIS — I10 HYPERTENSION, UNSPECIFIED TYPE: ICD-10-CM

## 2020-02-25 RX ORDER — LISINOPRIL AND HYDROCHLOROTHIAZIDE 20; 12.5 MG/1; MG/1
TABLET ORAL
Qty: 90 TABLET | Refills: 0 | Status: SHIPPED | OUTPATIENT
Start: 2020-02-25 | End: 2020-04-28

## 2020-04-27 DIAGNOSIS — I10 HYPERTENSION, UNSPECIFIED TYPE: ICD-10-CM

## 2020-04-28 RX ORDER — LISINOPRIL AND HYDROCHLOROTHIAZIDE 20; 12.5 MG/1; MG/1
TABLET ORAL
Qty: 90 TABLET | Refills: 0 | Status: SHIPPED | OUTPATIENT
Start: 2020-04-28 | End: 2020-08-26

## 2020-05-28 ENCOUNTER — OFFICE VISIT (OUTPATIENT)
Dept: FAMILY MEDICINE CLINIC | Facility: CLINIC | Age: 70
End: 2020-05-28
Payer: MEDICARE

## 2020-05-28 ENCOUNTER — APPOINTMENT (OUTPATIENT)
Dept: RADIOLOGY | Facility: CLINIC | Age: 70
End: 2020-05-28
Payer: MEDICARE

## 2020-05-28 VITALS
SYSTOLIC BLOOD PRESSURE: 136 MMHG | TEMPERATURE: 98.8 F | WEIGHT: 229.4 LBS | HEIGHT: 71 IN | OXYGEN SATURATION: 98 % | DIASTOLIC BLOOD PRESSURE: 74 MMHG | RESPIRATION RATE: 18 BRPM | HEART RATE: 76 BPM | BODY MASS INDEX: 32.11 KG/M2

## 2020-05-28 DIAGNOSIS — M25.562 ACUTE PAIN OF LEFT KNEE: ICD-10-CM

## 2020-05-28 DIAGNOSIS — R05.3 CHRONIC COUGH: Primary | ICD-10-CM

## 2020-05-28 PROBLEM — R05.9 COUGH: Status: RESOLVED | Noted: 2019-12-26 | Resolved: 2020-05-28

## 2020-05-28 PROBLEM — R73.01 ELEVATED FASTING BLOOD SUGAR: Status: RESOLVED | Noted: 2018-02-16 | Resolved: 2020-05-28

## 2020-05-28 PROCEDURE — 3075F SYST BP GE 130 - 139MM HG: CPT | Performed by: FAMILY MEDICINE

## 2020-05-28 PROCEDURE — 3078F DIAST BP <80 MM HG: CPT | Performed by: FAMILY MEDICINE

## 2020-05-28 PROCEDURE — 73562 X-RAY EXAM OF KNEE 3: CPT

## 2020-05-28 PROCEDURE — 99214 OFFICE O/P EST MOD 30 MIN: CPT | Performed by: FAMILY MEDICINE

## 2020-05-28 PROCEDURE — 1036F TOBACCO NON-USER: CPT | Performed by: FAMILY MEDICINE

## 2020-05-28 PROCEDURE — 3008F BODY MASS INDEX DOCD: CPT | Performed by: FAMILY MEDICINE

## 2020-05-28 PROCEDURE — 1160F RVW MEDS BY RX/DR IN RCRD: CPT | Performed by: FAMILY MEDICINE

## 2020-05-28 PROCEDURE — 4040F PNEUMOC VAC/ADMIN/RCVD: CPT | Performed by: FAMILY MEDICINE

## 2020-05-28 RX ORDER — ALBUTEROL SULFATE 90 UG/1
2 AEROSOL, METERED RESPIRATORY (INHALATION) EVERY 6 HOURS PRN
Qty: 1 INHALER | Refills: 5 | Status: SHIPPED | OUTPATIENT
Start: 2020-05-28

## 2020-06-08 ENCOUNTER — APPOINTMENT (OUTPATIENT)
Dept: LAB | Facility: CLINIC | Age: 70
End: 2020-06-08
Payer: MEDICARE

## 2020-06-08 ENCOUNTER — TRANSCRIBE ORDERS (OUTPATIENT)
Dept: ADMINISTRATIVE | Facility: HOSPITAL | Age: 70
End: 2020-06-08

## 2020-06-08 DIAGNOSIS — E78.5 HYPERLIPIDEMIA, UNSPECIFIED HYPERLIPIDEMIA TYPE: ICD-10-CM

## 2020-06-08 DIAGNOSIS — Z11.59 NEED FOR HEPATITIS C SCREENING TEST: ICD-10-CM

## 2020-06-08 DIAGNOSIS — Z13.1 DIABETES MELLITUS SCREENING: ICD-10-CM

## 2020-06-08 DIAGNOSIS — R97.20 ELEVATED PROSTATE SPECIFIC ANTIGEN (PSA): Primary | ICD-10-CM

## 2020-06-08 DIAGNOSIS — R97.20 ELEVATED PROSTATE SPECIFIC ANTIGEN (PSA): ICD-10-CM

## 2020-06-08 LAB
ALBUMIN SERPL BCP-MCNC: 3.8 G/DL (ref 3.5–5)
ALP SERPL-CCNC: 37 U/L (ref 46–116)
ALT SERPL W P-5'-P-CCNC: 30 U/L (ref 12–78)
ANION GAP SERPL CALCULATED.3IONS-SCNC: 6 MMOL/L (ref 4–13)
AST SERPL W P-5'-P-CCNC: 20 U/L (ref 5–45)
BILIRUB SERPL-MCNC: 0.79 MG/DL (ref 0.2–1)
BUN SERPL-MCNC: 20 MG/DL (ref 5–25)
CALCIUM SERPL-MCNC: 9.2 MG/DL (ref 8.3–10.1)
CHLORIDE SERPL-SCNC: 105 MMOL/L (ref 100–108)
CHOLEST SERPL-MCNC: 172 MG/DL (ref 50–200)
CO2 SERPL-SCNC: 25 MMOL/L (ref 21–32)
CREAT SERPL-MCNC: 1.34 MG/DL (ref 0.6–1.3)
GFR SERPL CREATININE-BSD FRML MDRD: 53 ML/MIN/1.73SQ M
GLUCOSE P FAST SERPL-MCNC: 99 MG/DL (ref 65–99)
HCV AB SER QL: NORMAL
HDLC SERPL-MCNC: 35 MG/DL
LDLC SERPL CALC-MCNC: 104 MG/DL (ref 0–100)
NONHDLC SERPL-MCNC: 137 MG/DL
POTASSIUM SERPL-SCNC: 4.2 MMOL/L (ref 3.5–5.3)
PROT SERPL-MCNC: 7.5 G/DL (ref 6.4–8.2)
PSA SERPL-MCNC: 4.2 NG/ML (ref 0–4)
SODIUM SERPL-SCNC: 136 MMOL/L (ref 136–145)
TRIGL SERPL-MCNC: 166 MG/DL

## 2020-06-08 PROCEDURE — 86803 HEPATITIS C AB TEST: CPT

## 2020-06-08 PROCEDURE — 36415 COLL VENOUS BLD VENIPUNCTURE: CPT

## 2020-06-08 PROCEDURE — 80053 COMPREHEN METABOLIC PANEL: CPT

## 2020-06-08 PROCEDURE — 84153 ASSAY OF PSA TOTAL: CPT

## 2020-06-08 PROCEDURE — 80061 LIPID PANEL: CPT

## 2020-06-09 DIAGNOSIS — Z20.828 EXPOSURE TO SARS-ASSOCIATED CORONAVIRUS: ICD-10-CM

## 2020-06-09 DIAGNOSIS — Z20.828 EXPOSURE TO SARS-ASSOCIATED CORONAVIRUS: Primary | ICD-10-CM

## 2020-06-09 PROCEDURE — U0003 INFECTIOUS AGENT DETECTION BY NUCLEIC ACID (DNA OR RNA); SEVERE ACUTE RESPIRATORY SYNDROME CORONAVIRUS 2 (SARS-COV-2) (CORONAVIRUS DISEASE [COVID-19]), AMPLIFIED PROBE TECHNIQUE, MAKING USE OF HIGH THROUGHPUT TECHNOLOGIES AS DESCRIBED BY CMS-2020-01-R: HCPCS

## 2020-06-09 PROCEDURE — NC001 PR NO CHARGE: Performed by: FAMILY MEDICINE

## 2020-06-10 LAB — SARS-COV-2 RNA SPEC QL NAA+PROBE: NOT DETECTED

## 2020-06-11 ENCOUNTER — HOSPITAL ENCOUNTER (OUTPATIENT)
Dept: PULMONOLOGY | Facility: HOSPITAL | Age: 70
End: 2020-06-11
Attending: FAMILY MEDICINE
Payer: MEDICARE

## 2020-06-11 ENCOUNTER — HOSPITAL ENCOUNTER (OUTPATIENT)
Dept: PULMONOLOGY | Facility: HOSPITAL | Age: 70
Discharge: HOME | End: 2020-06-11
Attending: FAMILY MEDICINE
Payer: MEDICARE

## 2020-06-11 DIAGNOSIS — R05.3 CHRONIC COUGH: ICD-10-CM

## 2020-06-11 DIAGNOSIS — R05.3 CHRONIC COUGH: Primary | ICD-10-CM

## 2020-06-11 PROCEDURE — 94729 DIFFUSING CAPACITY: CPT | Performed by: INTERNAL MEDICINE

## 2020-06-11 PROCEDURE — 94729 DIFFUSING CAPACITY: CPT

## 2020-06-11 PROCEDURE — 94760 N-INVAS EAR/PLS OXIMETRY 1: CPT

## 2020-06-11 PROCEDURE — 94727 GAS DIL/WSHOT DETER LNG VOL: CPT | Performed by: INTERNAL MEDICINE

## 2020-06-11 PROCEDURE — 94060 EVALUATION OF WHEEZING: CPT | Performed by: INTERNAL MEDICINE

## 2020-06-11 PROCEDURE — 94727 GAS DIL/WSHOT DETER LNG VOL: CPT

## 2020-06-11 PROCEDURE — 94060 EVALUATION OF WHEEZING: CPT

## 2020-08-21 DIAGNOSIS — E78.5 HYPERLIPIDEMIA, UNSPECIFIED HYPERLIPIDEMIA TYPE: ICD-10-CM

## 2020-08-21 RX ORDER — FENOFIBRATE 160 MG/1
TABLET ORAL
Qty: 90 TABLET | Refills: 2 | Status: SHIPPED | OUTPATIENT
Start: 2020-08-21

## 2020-08-26 DIAGNOSIS — I10 HYPERTENSION, UNSPECIFIED TYPE: ICD-10-CM

## 2020-08-26 RX ORDER — LISINOPRIL AND HYDROCHLOROTHIAZIDE 20; 12.5 MG/1; MG/1
TABLET ORAL
Qty: 90 TABLET | Refills: 0 | Status: SHIPPED | OUTPATIENT
Start: 2020-08-26 | End: 2020-10-23

## 2020-10-23 DIAGNOSIS — I10 HYPERTENSION, UNSPECIFIED TYPE: ICD-10-CM

## 2020-10-23 RX ORDER — LISINOPRIL AND HYDROCHLOROTHIAZIDE 20; 12.5 MG/1; MG/1
TABLET ORAL
Qty: 90 TABLET | Refills: 0 | Status: SHIPPED | OUTPATIENT
Start: 2020-10-23 | End: 2021-01-27

## 2021-01-27 DIAGNOSIS — I10 HYPERTENSION, UNSPECIFIED TYPE: ICD-10-CM

## 2021-01-27 RX ORDER — LISINOPRIL AND HYDROCHLOROTHIAZIDE 20; 12.5 MG/1; MG/1
TABLET ORAL
Qty: 90 TABLET | Refills: 0 | Status: SHIPPED | OUTPATIENT
Start: 2021-01-27

## 2021-02-04 DIAGNOSIS — G25.81 RLS (RESTLESS LEGS SYNDROME): ICD-10-CM

## 2021-02-04 RX ORDER — GABAPENTIN 100 MG/1
100 CAPSULE ORAL 2 TIMES DAILY
Qty: 180 CAPSULE | Refills: 3 | Status: SHIPPED | OUTPATIENT
Start: 2021-02-04 | End: 2021-02-08

## 2021-02-04 NOTE — TELEPHONE ENCOUNTER
Angela Brown with [facility] [employer] [facility] calling in requesting new script of gabapentin be sent to their pharmacy  90 day supply  Please review and sign off, thanks!

## 2021-02-08 DIAGNOSIS — G25.81 RLS (RESTLESS LEGS SYNDROME): ICD-10-CM

## 2021-02-08 RX ORDER — GABAPENTIN 100 MG/1
CAPSULE ORAL
Qty: 180 CAPSULE | Refills: 3 | Status: SHIPPED | OUTPATIENT
Start: 2021-02-08 | End: 2021-02-09 | Stop reason: SDUPTHER

## 2021-02-09 DIAGNOSIS — G25.81 RLS (RESTLESS LEGS SYNDROME): ICD-10-CM

## 2021-02-09 RX ORDER — GABAPENTIN 100 MG/1
100 CAPSULE ORAL 2 TIMES DAILY
Qty: 180 CAPSULE | Refills: 3 | Status: SHIPPED | OUTPATIENT
Start: 2021-02-09

## 2021-02-09 RX ORDER — GABAPENTIN 100 MG/1
CAPSULE ORAL
Qty: 180 CAPSULE | Refills: 3 | OUTPATIENT
Start: 2021-02-09

## 2021-03-09 DIAGNOSIS — Z23 ENCOUNTER FOR IMMUNIZATION: ICD-10-CM

## 2023-08-21 ENCOUNTER — VBI (OUTPATIENT)
Dept: ADMINISTRATIVE | Facility: OTHER | Age: 73
End: 2023-08-21